# Patient Record
Sex: FEMALE | Race: WHITE | NOT HISPANIC OR LATINO | URBAN - METROPOLITAN AREA
[De-identification: names, ages, dates, MRNs, and addresses within clinical notes are randomized per-mention and may not be internally consistent; named-entity substitution may affect disease eponyms.]

---

## 2023-12-21 DIAGNOSIS — I10 HYPERTENSION, UNSPECIFIED TYPE: ICD-10-CM

## 2023-12-22 RX ORDER — METOPROLOL SUCCINATE 50 MG/1
50 TABLET, EXTENDED RELEASE ORAL NIGHTLY
Qty: 90 TABLET | Refills: 0 | OUTPATIENT
Start: 2023-12-22

## 2023-12-22 RX ORDER — AMLODIPINE BESYLATE 5 MG/1
5 TABLET ORAL NIGHTLY
Qty: 90 TABLET | Refills: 0 | OUTPATIENT
Start: 2023-12-22

## 2024-05-23 ENCOUNTER — APPOINTMENT (OUTPATIENT)
Dept: CARDIOLOGY | Facility: HOSPITAL | Age: 84
End: 2024-05-23
Payer: MEDICARE

## 2024-05-23 ENCOUNTER — HOSPITAL ENCOUNTER (EMERGENCY)
Facility: HOSPITAL | Age: 84
Discharge: HOME | End: 2024-05-23
Attending: EMERGENCY MEDICINE
Payer: MEDICARE

## 2024-05-23 VITALS
HEART RATE: 71 BPM | TEMPERATURE: 97.9 F | OXYGEN SATURATION: 97 % | DIASTOLIC BLOOD PRESSURE: 86 MMHG | WEIGHT: 97.88 LBS | HEIGHT: 63 IN | BODY MASS INDEX: 17.34 KG/M2 | RESPIRATION RATE: 16 BRPM | SYSTOLIC BLOOD PRESSURE: 172 MMHG

## 2024-05-23 DIAGNOSIS — I10 PRIMARY HYPERTENSION: Primary | ICD-10-CM

## 2024-05-23 DIAGNOSIS — R53.81 MALAISE: ICD-10-CM

## 2024-05-23 LAB
ANION GAP SERPL CALC-SCNC: 10 MMOL/L
ATRIAL RATE: 86 BPM
BASOPHILS # BLD AUTO: 0.03 X10*3/UL (ref 0–0.1)
BASOPHILS NFR BLD AUTO: 0.4 %
BUN SERPL-MCNC: 15 MG/DL (ref 8–25)
CALCIUM SERPL-MCNC: 9.7 MG/DL (ref 8.5–10.4)
CHLORIDE SERPL-SCNC: 105 MMOL/L (ref 97–107)
CO2 SERPL-SCNC: 26 MMOL/L (ref 24–31)
CREAT SERPL-MCNC: 1 MG/DL (ref 0.4–1.6)
EGFRCR SERPLBLD CKD-EPI 2021: 56 ML/MIN/1.73M*2
EOSINOPHIL # BLD AUTO: 0.01 X10*3/UL (ref 0–0.4)
EOSINOPHIL NFR BLD AUTO: 0.1 %
ERYTHROCYTE [DISTWIDTH] IN BLOOD BY AUTOMATED COUNT: 14 % (ref 11.5–14.5)
GLUCOSE SERPL-MCNC: 105 MG/DL (ref 65–99)
HCT VFR BLD AUTO: 39.3 % (ref 36–46)
HGB BLD-MCNC: 13 G/DL (ref 12–16)
IMM GRANULOCYTES # BLD AUTO: 0.01 X10*3/UL (ref 0–0.5)
IMM GRANULOCYTES NFR BLD AUTO: 0.1 % (ref 0–0.9)
LYMPHOCYTES # BLD AUTO: 1.27 X10*3/UL (ref 0.8–3)
LYMPHOCYTES NFR BLD AUTO: 18.2 %
MCH RBC QN AUTO: 29.1 PG (ref 26–34)
MCHC RBC AUTO-ENTMCNC: 33.1 G/DL (ref 32–36)
MCV RBC AUTO: 88 FL (ref 80–100)
MONOCYTES # BLD AUTO: 0.62 X10*3/UL (ref 0.05–0.8)
MONOCYTES NFR BLD AUTO: 8.9 %
NEUTROPHILS # BLD AUTO: 5.03 X10*3/UL (ref 1.6–5.5)
NEUTROPHILS NFR BLD AUTO: 72.3 %
NRBC BLD-RTO: 0 /100 WBCS (ref 0–0)
PLATELET # BLD AUTO: 253 X10*3/UL (ref 150–450)
POTASSIUM SERPL-SCNC: 4.4 MMOL/L (ref 3.4–5.1)
Q ONSET: 220 MS
QRS COUNT: 8 BEATS
QRS DURATION: 112 MS
QT INTERVAL: 540 MS
QTC CALCULATION(BAZETT): 477 MS
QTC FREDERICIA: 497 MS
R AXIS: 92 DEGREES
RBC # BLD AUTO: 4.46 X10*6/UL (ref 4–5.2)
SODIUM SERPL-SCNC: 141 MMOL/L (ref 133–145)
T AXIS: 78 DEGREES
T OFFSET: 490 MS
VENTRICULAR RATE: 47 BPM
WBC # BLD AUTO: 7 X10*3/UL (ref 4.4–11.3)

## 2024-05-23 PROCEDURE — 93005 ELECTROCARDIOGRAM TRACING: CPT

## 2024-05-23 PROCEDURE — 85025 COMPLETE CBC W/AUTO DIFF WBC: CPT | Performed by: EMERGENCY MEDICINE

## 2024-05-23 PROCEDURE — 80048 BASIC METABOLIC PNL TOTAL CA: CPT | Performed by: EMERGENCY MEDICINE

## 2024-05-23 PROCEDURE — 99283 EMERGENCY DEPT VISIT LOW MDM: CPT

## 2024-05-23 PROCEDURE — 36415 COLL VENOUS BLD VENIPUNCTURE: CPT | Performed by: EMERGENCY MEDICINE

## 2024-05-23 PROCEDURE — 2500000001 HC RX 250 WO HCPCS SELF ADMINISTERED DRUGS (ALT 637 FOR MEDICARE OP): Performed by: EMERGENCY MEDICINE

## 2024-05-23 RX ORDER — AMLODIPINE BESYLATE 5 MG/1
10 TABLET ORAL ONCE
Status: COMPLETED | OUTPATIENT
Start: 2024-05-23 | End: 2024-05-23

## 2024-05-23 RX ORDER — AMLODIPINE BESYLATE 10 MG/1
10 TABLET ORAL DAILY
Qty: 30 TABLET | Refills: 0 | Status: SHIPPED | OUTPATIENT
Start: 2024-05-23 | End: 2024-06-22

## 2024-05-23 RX ORDER — HYDROCHLOROTHIAZIDE 25 MG/1
25 TABLET ORAL DAILY
Qty: 30 TABLET | Refills: 0 | Status: SHIPPED | OUTPATIENT
Start: 2024-05-23 | End: 2024-06-22

## 2024-05-23 RX ADMIN — AMLODIPINE BESYLATE 10 MG: 5 TABLET ORAL at 14:41

## 2024-05-23 ASSESSMENT — PAIN SCALES - GENERAL
PAINLEVEL_OUTOF10: 0 - NO PAIN

## 2024-05-23 ASSESSMENT — COLUMBIA-SUICIDE SEVERITY RATING SCALE - C-SSRS
1. IN THE PAST MONTH, HAVE YOU WISHED YOU WERE DEAD OR WISHED YOU COULD GO TO SLEEP AND NOT WAKE UP?: NO
6. HAVE YOU EVER DONE ANYTHING, STARTED TO DO ANYTHING, OR PREPARED TO DO ANYTHING TO END YOUR LIFE?: NO
2. HAVE YOU ACTUALLY HAD ANY THOUGHTS OF KILLING YOURSELF?: NO

## 2024-05-23 ASSESSMENT — PAIN - FUNCTIONAL ASSESSMENT: PAIN_FUNCTIONAL_ASSESSMENT: 0-10

## 2024-05-23 NOTE — ED PROVIDER NOTES
HPI   Chief Complaint   Patient presents with    Dizziness     Patient reports running out of her BP medication a couple months ago and has not been taking it. Patient feels lightheaded today. /64       83-year-old female presents for chief complaint of feeling malaised not her normal self.  This has been ongoing for approximately past 1 week.  Feeling mildly lightheaded but no dizziness, room spinning or vertiginous symptoms.  No feeling of off-balance.  States that she has not been taking her amlodipine, metoprolol for the past approximately 3 months because her blood pressure was better she thought she was okay so did not follow-up to get them refilled.  No chest pain, shortness of breath or other specific symptoms.  No headache.  No focal weakness or numbness.                           No data recorded                   Patient History   No past medical history on file.  Past Surgical History:   Procedure Laterality Date    CT GUIDED IMAGING FOR NEEDLE PLACEMENT  7/17/2017    CT GUIDED IMAGING FOR NEEDLE PLACEMENT [facility]     No family history on file.  Social History     Tobacco Use    Smoking status: Not on file    Smokeless tobacco: Not on file   Substance Use Topics    Alcohol use: Not on file    Drug use: Not on file       Physical Exam   ED Triage Vitals [05/23/24 1117]   Temperature Heart Rate Respirations BP   36.7 °C (98.1 °F) 50 18 170/64      Pulse Ox Temp Source Heart Rate Source Patient Position   97 % Temporal Monitor Sitting      BP Location FiO2 (%)     Right arm --       Physical Exam  Vitals and nursing note reviewed.     Constitutional:  Awake, alert, well appearing, nontoxic  HEENT:  Normocephalic, atraumatic  Neck: Trachea midline, no stridor  Respiratory/Chest:  Clear to auscultation bilaterally, no wheezes, rhonchi, or rales  CV:  Regular rate and regular rhythm, no murmurs, gallops, or rubs  Neuro: AAOx3, cranial nerves intact, strength 5/5 x 4 extremities, sensation  diffusely intact, no ataxia on extremeties, no truncal ataxia, normal test of skew, steady gait, NIH 0  Skin:  Warm and dry  ED Course & MDM   ED Course as of 05/23/24 1429   Thu May 23, 2024   1145 EKG on my independent interpretation: Sinus rhythm 47 bpm, rightward axis, incomplete right bundle branch block morphology but normal intervals, no acute ST or T wave abnormalities [TSERING]      ED Course User Index  [TSERING] Risa House MD         Diagnoses as of 05/23/24 1429   Primary hypertension   Malaise       Medical Decision Making  83-year-old female presents for malaise, hypertension, and lightheadedness.  No vertiginous or room spinning, NIH 0 no cerebellar findings on exam, normal test of skew, steady gait low suspicion for CVA.  Consider may be related to her significantly elevated blood pressure.  Orthostatic negative.  EKG without arrhythmia.  No syncope/near syncope low suspicion for cardiac etiology.  No chest pain to suggest ACS.  Labs obtained without significant abnormality.  Patient given dose of Norvasc which was her prior blood pressure medication for which she has been noncompliant for the past 3 months.  Heart rate bradycardic at times although she is not particularly symptomatic of this bradycardia although she was previously on metoprolol we will defer adding this medication back at this time given low heart rate we will add hydrochlorothiazide as well prescribe both of these for home.  Refer to PCP for follow-up.  Return precautions discussed.    Amount and/or Complexity of Data Reviewed  Labs: ordered. Decision-making details documented in ED Course.  Radiology: ordered and independent interpretation performed. Decision-making details documented in ED Course.  ECG/medicine tests: ordered and independent interpretation performed. Decision-making details documented in ED Course.        Procedure  Procedures     Risa House MD  05/23/24 9855

## 2024-05-23 NOTE — DISCHARGE INSTRUCTIONS
Begin taking amlodipine and hydrochlorothiazide for high blood pressure and make an appointment to follow-up with primary care physician as soon as possible.

## 2025-04-15 ENCOUNTER — APPOINTMENT (OUTPATIENT)
Dept: CARDIOLOGY | Facility: HOSPITAL | Age: 85
End: 2025-04-15
Payer: COMMERCIAL

## 2025-04-15 ENCOUNTER — APPOINTMENT (OUTPATIENT)
Dept: RADIOLOGY | Facility: HOSPITAL | Age: 85
End: 2025-04-15
Payer: COMMERCIAL

## 2025-04-15 ENCOUNTER — HOSPITAL ENCOUNTER (INPATIENT)
Facility: HOSPITAL | Age: 85
LOS: 2 days | Discharge: HOME | End: 2025-04-17
Admitting: STUDENT IN AN ORGANIZED HEALTH CARE EDUCATION/TRAINING PROGRAM
Payer: COMMERCIAL

## 2025-04-15 DIAGNOSIS — I10 ESSENTIAL HYPERTENSION: ICD-10-CM

## 2025-04-15 DIAGNOSIS — R00.1 BRADYCARDIA: ICD-10-CM

## 2025-04-15 DIAGNOSIS — I15.1 HYPERTENSION SECONDARY TO OTHER RENAL DISORDERS: ICD-10-CM

## 2025-04-15 DIAGNOSIS — I44.1 ATRIOVENTRICULAR BLOCK, SECOND DEGREE: ICD-10-CM

## 2025-04-15 DIAGNOSIS — I16.0 HYPERTENSIVE URGENCY: Primary | ICD-10-CM

## 2025-04-15 PROBLEM — L21.0 SEBORRHEA CAPITIS: Status: ACTIVE | Noted: 2025-04-15

## 2025-04-15 PROBLEM — C83.33 DIFFUSE LARGE B-CELL LYMPHOMA OF INTRA-ABDOMINAL LYMPH NODES (MULTI): Status: ACTIVE | Noted: 2025-04-15

## 2025-04-15 PROBLEM — E46 PROTEIN CALORIE MALNUTRITION (MULTI): Status: ACTIVE | Noted: 2025-04-15

## 2025-04-15 PROBLEM — E63.8 NUTRITIONAL INTAKE LESS THAN BODY REQUIREMENTS: Status: ACTIVE | Noted: 2025-04-15

## 2025-04-15 PROBLEM — Z85.72 HISTORY OF LYMPHOMA: Status: ACTIVE | Noted: 2025-04-15

## 2025-04-15 PROBLEM — E55.9 VITAMIN D DEFICIENCY: Status: ACTIVE | Noted: 2025-04-15

## 2025-04-15 PROBLEM — M65.30 ACQUIRED TRIGGER FINGER: Status: ACTIVE | Noted: 2025-04-15

## 2025-04-15 PROBLEM — K63.5 POLYP OF COLON: Status: ACTIVE | Noted: 2025-04-15

## 2025-04-15 PROBLEM — M19.90 OSTEOARTHRITIS: Status: ACTIVE | Noted: 2025-04-15

## 2025-04-15 PROBLEM — E83.52 HYPERCALCEMIA: Status: ACTIVE | Noted: 2025-04-15

## 2025-04-15 PROBLEM — R73.01 IMPAIRED FASTING GLUCOSE: Status: ACTIVE | Noted: 2025-04-15

## 2025-04-15 PROBLEM — M81.0 OSTEOPOROSIS: Status: ACTIVE | Noted: 2025-04-15

## 2025-04-15 PROBLEM — R63.0 LOSS OF APPETITE: Status: ACTIVE | Noted: 2025-04-15

## 2025-04-15 LAB
ALBUMIN SERPL BCP-MCNC: 3.4 G/DL (ref 3.4–5)
ALP SERPL-CCNC: 99 U/L (ref 33–136)
ALT SERPL W P-5'-P-CCNC: 27 U/L (ref 7–45)
ANION GAP SERPL CALCULATED.3IONS-SCNC: 9 MMOL/L (ref 10–20)
AST SERPL W P-5'-P-CCNC: 27 U/L (ref 9–39)
BASOPHILS # BLD AUTO: 0.01 X10*3/UL (ref 0–0.1)
BASOPHILS NFR BLD AUTO: 0.2 %
BILIRUB SERPL-MCNC: 0.8 MG/DL (ref 0–1.2)
BUN SERPL-MCNC: 22 MG/DL (ref 6–23)
CALCIUM SERPL-MCNC: 8.3 MG/DL (ref 8.6–10.3)
CARDIAC TROPONIN I PNL SERPL HS: 14 NG/L (ref 0–13)
CARDIAC TROPONIN I PNL SERPL HS: 16 NG/L (ref 0–13)
CHLORIDE SERPL-SCNC: 105 MMOL/L (ref 98–107)
CO2 SERPL-SCNC: 26 MMOL/L (ref 21–32)
CREAT SERPL-MCNC: 0.65 MG/DL (ref 0.5–1.05)
EGFRCR SERPLBLD CKD-EPI 2021: 87 ML/MIN/1.73M*2
EOSINOPHIL # BLD AUTO: 0 X10*3/UL (ref 0–0.4)
EOSINOPHIL NFR BLD AUTO: 0 %
ERYTHROCYTE [DISTWIDTH] IN BLOOD BY AUTOMATED COUNT: 14.9 % (ref 11.5–14.5)
GLUCOSE SERPL-MCNC: 119 MG/DL (ref 74–99)
HCT VFR BLD AUTO: 34.5 % (ref 36–46)
HGB BLD-MCNC: 11.2 G/DL (ref 12–16)
IMM GRANULOCYTES # BLD AUTO: 0.02 X10*3/UL (ref 0–0.5)
IMM GRANULOCYTES NFR BLD AUTO: 0.3 % (ref 0–0.9)
LYMPHOCYTES # BLD AUTO: 0.93 X10*3/UL (ref 0.8–3)
LYMPHOCYTES NFR BLD AUTO: 15.4 %
MCH RBC QN AUTO: 29.4 PG (ref 26–34)
MCHC RBC AUTO-ENTMCNC: 32.5 G/DL (ref 32–36)
MCV RBC AUTO: 91 FL (ref 80–100)
MONOCYTES # BLD AUTO: 0.28 X10*3/UL (ref 0.05–0.8)
MONOCYTES NFR BLD AUTO: 4.7 %
NEUTROPHILS # BLD AUTO: 4.78 X10*3/UL (ref 1.6–5.5)
NEUTROPHILS NFR BLD AUTO: 79.4 %
NRBC BLD-RTO: 0 /100 WBCS (ref 0–0)
PLATELET # BLD AUTO: 225 X10*3/UL (ref 150–450)
POTASSIUM SERPL-SCNC: 3.4 MMOL/L (ref 3.5–5.3)
PROT SERPL-MCNC: 5.8 G/DL (ref 6.4–8.2)
RBC # BLD AUTO: 3.81 X10*6/UL (ref 4–5.2)
SODIUM SERPL-SCNC: 137 MMOL/L (ref 136–145)
WBC # BLD AUTO: 6 X10*3/UL (ref 4.4–11.3)

## 2025-04-15 PROCEDURE — 70450 CT HEAD/BRAIN W/O DYE: CPT

## 2025-04-15 PROCEDURE — 36415 COLL VENOUS BLD VENIPUNCTURE: CPT

## 2025-04-15 PROCEDURE — 84484 ASSAY OF TROPONIN QUANT: CPT

## 2025-04-15 PROCEDURE — 93005 ELECTROCARDIOGRAM TRACING: CPT

## 2025-04-15 PROCEDURE — 2500000004 HC RX 250 GENERAL PHARMACY W/ HCPCS (ALT 636 FOR OP/ED)

## 2025-04-15 PROCEDURE — 99291 CRITICAL CARE FIRST HOUR: CPT

## 2025-04-15 PROCEDURE — 80053 COMPREHEN METABOLIC PANEL: CPT

## 2025-04-15 PROCEDURE — 85025 COMPLETE CBC W/AUTO DIFF WBC: CPT

## 2025-04-15 PROCEDURE — 93010 ELECTROCARDIOGRAM REPORT: CPT | Performed by: INTERNAL MEDICINE

## 2025-04-15 PROCEDURE — 2500000001 HC RX 250 WO HCPCS SELF ADMINISTERED DRUGS (ALT 637 FOR MEDICARE OP)

## 2025-04-15 PROCEDURE — 70450 CT HEAD/BRAIN W/O DYE: CPT | Performed by: RADIOLOGY

## 2025-04-15 PROCEDURE — 96375 TX/PRO/DX INJ NEW DRUG ADDON: CPT

## 2025-04-15 PROCEDURE — 2020000001 HC ICU ROOM DAILY

## 2025-04-15 PROCEDURE — 96374 THER/PROPH/DIAG INJ IV PUSH: CPT

## 2025-04-15 RX ORDER — HYDROCHLOROTHIAZIDE 25 MG/1
25 TABLET ORAL ONCE
Status: COMPLETED | OUTPATIENT
Start: 2025-04-15 | End: 2025-04-15

## 2025-04-15 RX ORDER — NICARDIPINE HYDROCHLORIDE 0.2 MG/ML
2.5-15 INJECTION INTRAVENOUS CONTINUOUS
Status: DISCONTINUED | OUTPATIENT
Start: 2025-04-15 | End: 2025-04-16

## 2025-04-15 RX ORDER — ATROPINE SULFATE 0.1 MG/ML
0.4 INJECTION INTRAVENOUS ONCE
Status: COMPLETED | OUTPATIENT
Start: 2025-04-15 | End: 2025-04-15

## 2025-04-15 RX ORDER — ACETAMINOPHEN 325 MG/1
975 TABLET ORAL ONCE
Status: COMPLETED | OUTPATIENT
Start: 2025-04-15 | End: 2025-04-15

## 2025-04-15 RX ORDER — FAMOTIDINE 10 MG/ML
20 INJECTION, SOLUTION INTRAVENOUS DAILY
Status: DISCONTINUED | OUTPATIENT
Start: 2025-04-16 | End: 2025-04-17 | Stop reason: HOSPADM

## 2025-04-15 RX ORDER — PROCHLORPERAZINE EDISYLATE 5 MG/ML
10 INJECTION INTRAMUSCULAR; INTRAVENOUS ONCE
Status: COMPLETED | OUTPATIENT
Start: 2025-04-15 | End: 2025-04-15

## 2025-04-15 RX ORDER — DIPHENHYDRAMINE HYDROCHLORIDE 50 MG/ML
25 INJECTION, SOLUTION INTRAMUSCULAR; INTRAVENOUS ONCE
Status: COMPLETED | OUTPATIENT
Start: 2025-04-15 | End: 2025-04-15

## 2025-04-15 RX ORDER — ACETAMINOPHEN 160 MG/5ML
650 SOLUTION ORAL EVERY 4 HOURS PRN
Status: DISCONTINUED | OUTPATIENT
Start: 2025-04-15 | End: 2025-04-17 | Stop reason: HOSPADM

## 2025-04-15 RX ORDER — AMLODIPINE BESYLATE 10 MG/1
10 TABLET ORAL DAILY
Status: DISCONTINUED | OUTPATIENT
Start: 2025-04-16 | End: 2025-04-16

## 2025-04-15 RX ORDER — FAMOTIDINE 20 MG/1
20 TABLET, FILM COATED ORAL DAILY
Status: DISCONTINUED | OUTPATIENT
Start: 2025-04-16 | End: 2025-04-17 | Stop reason: HOSPADM

## 2025-04-15 RX ORDER — KETOROLAC TROMETHAMINE 30 MG/ML
15 INJECTION, SOLUTION INTRAMUSCULAR; INTRAVENOUS ONCE
Status: COMPLETED | OUTPATIENT
Start: 2025-04-15 | End: 2025-04-15

## 2025-04-15 RX ORDER — HYDRALAZINE HYDROCHLORIDE 20 MG/ML
5 INJECTION INTRAMUSCULAR; INTRAVENOUS ONCE
Status: COMPLETED | OUTPATIENT
Start: 2025-04-15 | End: 2025-04-15

## 2025-04-15 RX ORDER — ACETAMINOPHEN 325 MG/1
650 TABLET ORAL EVERY 4 HOURS PRN
Status: DISCONTINUED | OUTPATIENT
Start: 2025-04-15 | End: 2025-04-17 | Stop reason: HOSPADM

## 2025-04-15 RX ORDER — ATROPINE SULFATE 0.1 MG/ML
INJECTION INTRAVENOUS
Status: COMPLETED
Start: 2025-04-15 | End: 2025-04-15

## 2025-04-15 RX ORDER — AMLODIPINE BESYLATE 5 MG/1
10 TABLET ORAL ONCE
Status: COMPLETED | OUTPATIENT
Start: 2025-04-15 | End: 2025-04-15

## 2025-04-15 RX ORDER — ACETAMINOPHEN 650 MG/1
650 SUPPOSITORY RECTAL EVERY 4 HOURS PRN
Status: DISCONTINUED | OUTPATIENT
Start: 2025-04-15 | End: 2025-04-17 | Stop reason: HOSPADM

## 2025-04-15 RX ORDER — POLYETHYLENE GLYCOL 3350 17 G/17G
17 POWDER, FOR SOLUTION ORAL DAILY
Status: DISCONTINUED | OUTPATIENT
Start: 2025-04-16 | End: 2025-04-17 | Stop reason: HOSPADM

## 2025-04-15 RX ADMIN — DIPHENHYDRAMINE HYDROCHLORIDE 25 MG: 50 INJECTION, SOLUTION INTRAMUSCULAR; INTRAVENOUS at 16:15

## 2025-04-15 RX ADMIN — AMLODIPINE BESYLATE 10 MG: 5 TABLET ORAL at 19:56

## 2025-04-15 RX ADMIN — HYDRALAZINE HYDROCHLORIDE 5 MG: 20 INJECTION INTRAMUSCULAR; INTRAVENOUS at 21:33

## 2025-04-15 RX ADMIN — PROCHLORPERAZINE EDISYLATE 10 MG: 5 INJECTION INTRAMUSCULAR; INTRAVENOUS at 16:15

## 2025-04-15 RX ADMIN — NICARDIPINE HYDROCHLORIDE 2.5 MG/HR: 0.2 INJECTION INTRAVENOUS at 22:55

## 2025-04-15 RX ADMIN — ATROPINE SULFATE 0.4 MG: 0.1 INJECTION INTRAVENOUS at 19:57

## 2025-04-15 RX ADMIN — HYDROCHLOROTHIAZIDE 25 MG: 25 TABLET ORAL at 19:56

## 2025-04-15 RX ADMIN — ACETAMINOPHEN 975 MG: 325 TABLET ORAL at 16:14

## 2025-04-15 RX ADMIN — KETOROLAC TROMETHAMINE 15 MG: 30 INJECTION, SOLUTION INTRAMUSCULAR at 22:24

## 2025-04-15 SDOH — SOCIAL STABILITY: SOCIAL INSECURITY: HAVE YOU HAD ANY THOUGHTS OF HARMING ANYONE ELSE?: NO

## 2025-04-15 SDOH — SOCIAL STABILITY: SOCIAL INSECURITY: WITHIN THE LAST YEAR, HAVE YOU BEEN AFRAID OF YOUR PARTNER OR EX-PARTNER?: NO

## 2025-04-15 SDOH — ECONOMIC STABILITY: FOOD INSECURITY: WITHIN THE PAST 12 MONTHS, YOU WORRIED THAT YOUR FOOD WOULD RUN OUT BEFORE YOU GOT THE MONEY TO BUY MORE.: NEVER TRUE

## 2025-04-15 SDOH — ECONOMIC STABILITY: INCOME INSECURITY: IN THE PAST 12 MONTHS HAS THE ELECTRIC, GAS, OIL, OR WATER COMPANY THREATENED TO SHUT OFF SERVICES IN YOUR HOME?: NO

## 2025-04-15 SDOH — SOCIAL STABILITY: SOCIAL INSECURITY: WITHIN THE LAST YEAR, HAVE YOU BEEN HUMILIATED OR EMOTIONALLY ABUSED IN OTHER WAYS BY YOUR PARTNER OR EX-PARTNER?: NO

## 2025-04-15 SDOH — SOCIAL STABILITY: SOCIAL INSECURITY: WERE YOU ABLE TO COMPLETE ALL THE BEHAVIORAL HEALTH SCREENINGS?: YES

## 2025-04-15 SDOH — SOCIAL STABILITY: SOCIAL INSECURITY: DO YOU FEEL ANYONE HAS EXPLOITED OR TAKEN ADVANTAGE OF YOU FINANCIALLY OR OF YOUR PERSONAL PROPERTY?: NO

## 2025-04-15 SDOH — SOCIAL STABILITY: SOCIAL INSECURITY
WITHIN THE LAST YEAR, HAVE YOU BEEN RAPED OR FORCED TO HAVE ANY KIND OF SEXUAL ACTIVITY BY YOUR PARTNER OR EX-PARTNER?: NO

## 2025-04-15 SDOH — SOCIAL STABILITY: SOCIAL INSECURITY
WITHIN THE LAST YEAR, HAVE YOU BEEN KICKED, HIT, SLAPPED, OR OTHERWISE PHYSICALLY HURT BY YOUR PARTNER OR EX-PARTNER?: NO

## 2025-04-15 SDOH — SOCIAL STABILITY: SOCIAL INSECURITY: DO YOU FEEL UNSAFE GOING BACK TO THE PLACE WHERE YOU ARE LIVING?: NO

## 2025-04-15 SDOH — SOCIAL STABILITY: SOCIAL INSECURITY: ARE THERE ANY APPARENT SIGNS OF INJURIES/BEHAVIORS THAT COULD BE RELATED TO ABUSE/NEGLECT?: NO

## 2025-04-15 SDOH — ECONOMIC STABILITY: FOOD INSECURITY: WITHIN THE PAST 12 MONTHS, THE FOOD YOU BOUGHT JUST DIDN'T LAST AND YOU DIDN'T HAVE MONEY TO GET MORE.: NEVER TRUE

## 2025-04-15 SDOH — SOCIAL STABILITY: SOCIAL INSECURITY: ARE YOU OR HAVE YOU BEEN THREATENED OR ABUSED PHYSICALLY, EMOTIONALLY, OR SEXUALLY BY ANYONE?: NO

## 2025-04-15 SDOH — SOCIAL STABILITY: SOCIAL INSECURITY: HAVE YOU HAD THOUGHTS OF HARMING ANYONE ELSE?: NO

## 2025-04-15 SDOH — SOCIAL STABILITY: SOCIAL INSECURITY: HAS ANYONE EVER THREATENED TO HURT YOUR FAMILY OR YOUR PETS?: NO

## 2025-04-15 SDOH — SOCIAL STABILITY: SOCIAL INSECURITY: ABUSE: ADULT

## 2025-04-15 SDOH — SOCIAL STABILITY: SOCIAL INSECURITY: DOES ANYONE TRY TO KEEP YOU FROM HAVING/CONTACTING OTHER FRIENDS OR DOING THINGS OUTSIDE YOUR HOME?: NO

## 2025-04-15 ASSESSMENT — COGNITIVE AND FUNCTIONAL STATUS - GENERAL
DRESSING REGULAR LOWER BODY CLOTHING: TOTAL
MOVING TO AND FROM BED TO CHAIR: TOTAL
DRESSING REGULAR UPPER BODY CLOTHING: TOTAL
MOBILITY SCORE: 6
HELP NEEDED FOR BATHING: TOTAL
EATING MEALS: TOTAL
MOVING FROM LYING ON BACK TO SITTING ON SIDE OF FLAT BED WITH BEDRAILS: TOTAL
TOILETING: TOTAL
CLIMB 3 TO 5 STEPS WITH RAILING: TOTAL
STANDING UP FROM CHAIR USING ARMS: TOTAL
WALKING IN HOSPITAL ROOM: TOTAL
DAILY ACTIVITIY SCORE: 6
PATIENT BASELINE BEDBOUND: NO
PERSONAL GROOMING: TOTAL
TURNING FROM BACK TO SIDE WHILE IN FLAT BAD: TOTAL

## 2025-04-15 ASSESSMENT — ACTIVITIES OF DAILY LIVING (ADL)
ADEQUATE_TO_COMPLETE_ADL: YES
GROOMING: INDEPENDENT
JUDGMENT_ADEQUATE_SAFELY_COMPLETE_DAILY_ACTIVITIES: YES
WALKS IN HOME: INDEPENDENT
HEARING - RIGHT EAR: FUNCTIONAL
HEARING - LEFT EAR: FUNCTIONAL
FEEDING YOURSELF: INDEPENDENT
DRESSING YOURSELF: INDEPENDENT
BATHING: INDEPENDENT
TOILETING: INDEPENDENT
PATIENT'S MEMORY ADEQUATE TO SAFELY COMPLETE DAILY ACTIVITIES?: YES
LACK_OF_TRANSPORTATION: NO

## 2025-04-15 ASSESSMENT — LIFESTYLE VARIABLES
AUDIT-C TOTAL SCORE: 0
HOW OFTEN DO YOU HAVE A DRINK CONTAINING ALCOHOL: NEVER
HOW MANY STANDARD DRINKS CONTAINING ALCOHOL DO YOU HAVE ON A TYPICAL DAY: PATIENT DOES NOT DRINK
HOW OFTEN DO YOU HAVE 6 OR MORE DRINKS ON ONE OCCASION: NEVER
AUDIT-C TOTAL SCORE: 0
SKIP TO QUESTIONS 9-10: 1

## 2025-04-15 ASSESSMENT — PAIN DESCRIPTION - LOCATION: LOCATION: HEAD

## 2025-04-15 ASSESSMENT — PAIN SCALES - GENERAL
PAINLEVEL_OUTOF10: 8
PAINLEVEL_OUTOF10: 0 - NO PAIN

## 2025-04-15 ASSESSMENT — PAIN - FUNCTIONAL ASSESSMENT: PAIN_FUNCTIONAL_ASSESSMENT: 0-10

## 2025-04-15 ASSESSMENT — PATIENT HEALTH QUESTIONNAIRE - PHQ9
2. FEELING DOWN, DEPRESSED OR HOPELESS: NOT AT ALL
1. LITTLE INTEREST OR PLEASURE IN DOING THINGS: NOT AT ALL
SUM OF ALL RESPONSES TO PHQ9 QUESTIONS 1 & 2: 0

## 2025-04-15 ASSESSMENT — COLUMBIA-SUICIDE SEVERITY RATING SCALE - C-SSRS
2. HAVE YOU ACTUALLY HAD ANY THOUGHTS OF KILLING YOURSELF?: NO
6. HAVE YOU EVER DONE ANYTHING, STARTED TO DO ANYTHING, OR PREPARED TO DO ANYTHING TO END YOUR LIFE?: NO
1. IN THE PAST MONTH, HAVE YOU WISHED YOU WERE DEAD OR WISHED YOU COULD GO TO SLEEP AND NOT WAKE UP?: NO

## 2025-04-15 NOTE — ED PROVIDER NOTES
THIS IS MY EMANUEL SUPERVISORY AND SHARED VISIT NOTE:    I personally saw the patient and made/approved the management plan and take responsibility for the patient management.    History: Patient is a 84-year-old female presents today with a chief complaint of a headache, she states she has had a headache for the last few weeks, she also having some generalized pain, has a history of high blood pressure, is not taking medications for this, the patient took Aleve, does not have much relief at all, patient denies any chest pain shortness of breath denies any dizziness or blurred vision denies any fevers or chills.    Exam: GENERAL APPEARANCE: Awake and alert.     HEENT: Normocephalic, atraumatic. Extraocular muscles are intact. Pupils equal round and reactive to light.  CHEST: Nontender to palpation. Clear to auscultation bilaterally. No rales, rhonchi, or wheezing.   HEART: S1, S2. Regular rate and rhythm. No murmurs, gallops or rubs.  Strong and equal pulses in the extremities.   ABDOMEN: Soft,.  non-tender.  No rebound or guarding, bowel sounds normal x 4 quadrants  NEUROLOGICAL: Awake, alert and oriented x 3.  Cranial nerves II through XII grossly intact      MDM: Patient seen and evaluated at bedside, patient is in no acute distress.  I will order a CBC, CMP, troponin, CT head, EKG, give the patient Tylenol, Compazine, Benadryl, hydrochlorothiazide, Norvasc, EKG. Differential diagnosis includes but is not limited to hypertensive urgency, hypertensive emergency, ACS, headache, migraine  Patient CBC shows hemoglobin 11.2, hematocrit 34.5, calcium 3.4, troponin 14 or 16, patient CT head results listed below, EKG does show a second-degree type II block, patient will be admitted to the general medicine team with a cardiology consultation.  No  Diagnosis: Hypertensive urgency, bradycardia  Transthoracic Echo (TTE) Complete   Final Result      CT head wo IV contrast   Final Result   No acute intracranial abnormality.         Chronic changes as noted above.        MACRO:   None        Signed by: Aric Brewer 4/15/2025 6:13 PM   Dictation workstation:   [id number]      Vascular US Renal Artery Duplex Complete    (Results Pending)     Results for orders placed or performed during the hospital encounter of 04/15/25   CBC and Auto Differential    Collection Time: 04/15/25  7:08 PM   Result Value Ref Range    WBC 6.0 4.4 - 11.3 x10*3/uL    nRBC 0.0 0.0 - 0.0 /100 WBCs    RBC 3.81 (L) 4.00 - 5.20 x10*6/uL    Hemoglobin 11.2 (L) 12.0 - 16.0 g/dL    Hematocrit 34.5 (L) 36.0 - 46.0 %    MCV 91 80 - 100 fL    MCH 29.4 26.0 - 34.0 pg    MCHC 32.5 32.0 - 36.0 g/dL    RDW 14.9 (H) 11.5 - 14.5 %    Platelets 225 150 - 450 x10*3/uL    Neutrophils % 79.4 40.0 - 80.0 %    Immature Granulocytes %, Automated 0.3 0.0 - 0.9 %    Lymphocytes % 15.4 13.0 - 44.0 %    Monocytes % 4.7 2.0 - 10.0 %    Eosinophils % 0.0 0.0 - 6.0 %    Basophils % 0.2 0.0 - 2.0 %    Neutrophils Absolute 4.78 1.60 - 5.50 x10*3/uL    Immature Granulocytes Absolute, Automated 0.02 0.00 - 0.50 x10*3/uL    Lymphocytes Absolute 0.93 0.80 - 3.00 x10*3/uL    Monocytes Absolute 0.28 0.05 - 0.80 x10*3/uL    Eosinophils Absolute 0.00 0.00 - 0.40 x10*3/uL    Basophils Absolute 0.01 0.00 - 0.10 x10*3/uL   Comprehensive metabolic panel    Collection Time: 04/15/25  7:08 PM   Result Value Ref Range    Glucose 119 (H) 74 - 99 mg/dL    Sodium 137 136 - 145 mmol/L    Potassium 3.4 (L) 3.5 - 5.3 mmol/L    Chloride 105 98 - 107 mmol/L    Bicarbonate 26 21 - 32 mmol/L    Anion Gap 9 (L) 10 - 20 mmol/L    Urea Nitrogen 22 6 - 23 mg/dL    Creatinine 0.65 0.50 - 1.05 mg/dL    eGFR 87 >60 mL/min/1.73m*2    Calcium 8.3 (L) 8.6 - 10.3 mg/dL    Albumin 3.4 3.4 - 5.0 g/dL    Alkaline Phosphatase 99 33 - 136 U/L    Total Protein 5.8 (L) 6.4 - 8.2 g/dL    AST 27 9 - 39 U/L    Bilirubin, Total 0.8 0.0 - 1.2 mg/dL    ALT 27 7 - 45 U/L   Troponin I, High Sensitivity, Initial    Collection Time: 04/15/25  7:08  PM   Result Value Ref Range    Troponin I, High Sensitivity 14 (H) 0 - 13 ng/L   Electrocardiogram, 12-lead PRN ACS symptoms    Collection Time: 04/15/25  7:15 PM   Result Value Ref Range    Ventricular Rate 42 BPM    Atrial Rate 82 BPM    VA Interval 146 ms    QRS Duration 104 ms    QT Interval 686 ms    QTC Calculation(Bazett) 572 ms    P Axis 79 degrees    R Axis 95 degrees    T Axis 37 degrees    QRS Count 7 beats    Q Onset 223 ms    P Onset 150 ms    P Offset 201 ms    T Offset 566 ms    QTC Fredericia 609 ms   ECG 12 lead    Collection Time: 04/15/25  7:15 PM   Result Value Ref Range    Ventricular Rate 55 BPM    Atrial Rate 55 BPM    VA Interval 144 ms    QRS Duration 110 ms    QT Interval 602 ms    QTC Calculation(Bazett) 575 ms    P Axis 71 degrees    R Axis 90 degrees    T Axis 38 degrees    QRS Count 9 beats    Q Onset 219 ms    P Onset 147 ms    P Offset 197 ms    T Offset 520 ms    QTC Fredericia 584 ms   Troponin, High Sensitivity, 1 Hour    Collection Time: 04/15/25  8:02 PM   Result Value Ref Range    Troponin I, High Sensitivity 16 (H) 0 - 13 ng/L   CBC    Collection Time: 04/16/25  4:17 AM   Result Value Ref Range    WBC 7.2 4.4 - 11.3 x10*3/uL    nRBC 0.0 0.0 - 0.0 /100 WBCs    RBC 4.63 4.00 - 5.20 x10*6/uL    Hemoglobin 13.6 12.0 - 16.0 g/dL    Hematocrit 40.3 36.0 - 46.0 %    MCV 87 80 - 100 fL    MCH 29.4 26.0 - 34.0 pg    MCHC 33.7 32.0 - 36.0 g/dL    RDW 14.6 (H) 11.5 - 14.5 %    Platelets 288 150 - 450 x10*3/uL   Basic metabolic panel    Collection Time: 04/16/25  4:17 AM   Result Value Ref Range    Glucose 108 (H) 74 - 99 mg/dL    Sodium 133 (L) 136 - 145 mmol/L    Potassium 3.5 3.5 - 5.3 mmol/L    Chloride 100 98 - 107 mmol/L    Bicarbonate 26 21 - 32 mmol/L    Anion Gap 11 10 - 20 mmol/L    Urea Nitrogen 22 6 - 23 mg/dL    Creatinine 0.70 0.50 - 1.05 mg/dL    eGFR 85 >60 mL/min/1.73m*2    Calcium 9.2 8.6 - 10.3 mg/dL   Transthoracic Echo (TTE) Complete    Collection Time: 04/16/25 11:23  AM   Result Value Ref Range    AV pk pooja 1.79 m/s    LVOT diam 1.70 cm    MV E/A ratio 0.65     Tricuspid annular plane systolic excursion 2.3 cm    LV Biplane EF 65 %    LV EF 68 %    RV free wall pk S' 11.30 cm/s    RVSP 27.8 mmHg    LVIDd 3.65 cm    AV pk grad 13 mmHg    Aortic Valve Area by Continuity of Peak Velocity 2.43 cm2    LV A4C EF 64.1      Critical Care Time: 35 minutes excluding time spent performing procedures, treating other patients, and teaching time.    Critical Concern/Vital Organ System Affected: Cardiovascular system    Critical Intervention: Initiation of antihypertensive, discussion with consultants, close monitoring and reassessment    Please see EMANUEL note for further details    Sections of this report were created using voice-to-text technology and may contain errors in translation    Selvin Concepcion DO  Emergency Medicine       Selvin Concepcion DO  04/16/25 6343

## 2025-04-15 NOTE — ED PROVIDER NOTES
HPI   Chief Complaint   Patient presents with    Headache     Patient ambulatory tpo triage with co headache the past few weeks. Patient stated it is generalized and pain 8/10. Patient haqs hx of high blood pressure butdoesnt take any meds for it now NIH 0. Patient took aleve with little relief.        HPI  84-year-old female with history of hypertension presents for evaluation of headache.  Patient states she has been having intermittent headaches over the past 2 weeks but yesterday her headache was worse than normal and has progressed into today.  States that it is bilateral throbbing sensation in the front of her forehead and also in the back of her head.  Denies head injury or trauma.  No neck pain or stiffness.  No recent illness or fever.  States that she stopped taking her blood pressure medication a few months ago because she felt great and did not feel that she needed it anymore.  She denies extremity weakness or numbness.  No dizziness or lightheadedness.  Denies chest pain or shortness of breath.  Denies vision changes.  Otherwise has no acute complaints.      Patient History   No past medical history on file.  Past Surgical History:   Procedure Laterality Date    CT GUIDED IMAGING FOR NEEDLE PLACEMENT  7/17/2017    CT GUIDED IMAGING FOR NEEDLE PLACEMENT [facility]     No family history on file.  Social History     Tobacco Use    Smoking status: Not on file    Smokeless tobacco: Not on file   Substance Use Topics    Alcohol use: Not on file    Drug use: Not on file       Physical Exam   ED Triage Vitals [04/15/25 1532]   Temperature Heart Rate Respirations BP   36.5 °C (97.7 °F) (!) 49 16 (!) 214/78      Pulse Ox Temp Source Heart Rate Source Patient Position   98 % Temporal Monitor --      BP Location FiO2 (%)     -- --       Physical Exam  Vitals and nursing note reviewed.   Constitutional:       General: She is not in acute distress.     Appearance: She is well-developed.   HENT:      Head:  Normocephalic and atraumatic.   Eyes:      Conjunctiva/sclera: Conjunctivae normal.   Cardiovascular:      Rate and Rhythm: Normal rate and regular rhythm.      Heart sounds: No murmur heard.  Pulmonary:      Effort: Pulmonary effort is normal. No respiratory distress.      Breath sounds: Normal breath sounds.   Abdominal:      Palpations: Abdomen is soft.      Tenderness: There is no abdominal tenderness.   Musculoskeletal:         General: No swelling.      Cervical back: Neck supple.   Skin:     General: Skin is warm and dry.      Capillary Refill: Capillary refill takes less than 2 seconds.   Neurological:      Mental Status: She is alert and oriented to person, place, and time.      Comments: Oriented to person place and time, symmetric strength of bilateral upper and lower extremities, intact sensation bilaterally.  No facial droop or vision deficit.  No limb ataxia.  NIH stroke scale score of 0.   Psychiatric:         Mood and Affect: Mood normal.           ED Course & MDM   ED Course as of 04/15/25 2232   Tu Apr 15, 2025   1915 EKG interpreted by myself independently, EKG shows a second-degree AV block with 2 1 AVB conduction, rate of 42 bpm, parable 146, , QT C572, this is no solution present, negative for acute MI [RICHARD]   2053 Discussed EKG findings with cardiologist Dr. Sloan he states this is a 2-1 AV block that can be followed up with outpatient provided patient does not have any active symptoms at this time. [JJ]      ED Course User Index  [RICHARD] Selvin Concepcion DO  [JJ] Analilia Elise PA-C         Diagnoses as of 04/15/25 2232   Hypertensive urgency   Bradycardia                 No data recorded     [place] Coma Scale Score: 15 (04/15/25 1539 : Shira Vaca, ELAINE)                           Medical Decision Making  Parts of this chart have been completed using voice recognition software. Please excuse any errors of transcription.  My thought process and reason for plan has been formulated from the  time that I saw the patient until the time of disposition and is not specific to one specific moment during their visit and furthermore my MDM encompasses this entire chart and not only this text box.      HPI: Detailed above.    Exam: A medically appropriate exam performed, outlined above, given the known history and presentation.    History obtained from: Patient    EKG: Interpreted by attending physician and reviewed by me    Social Determinants of Health considered during this visit: Lives independently    Medications given during visit:  Medications   niCARdipine (Cardene) 40 mg in sodium chloride 200 mL (0.2 mg/mL) infusion (premix) (has no administration in time range)   diphenhydrAMINE (BENADryl) injection 25 mg (25 mg intravenous Given 4/15/25 1615)   prochlorperazine (Compazine) injection 10 mg (10 mg intravenous Given 4/15/25 1615)   acetaminophen (Tylenol) tablet 975 mg (975 mg oral Given 4/15/25 1614)   amLODIPine (Norvasc) tablet 10 mg (10 mg oral Given 4/15/25 1956)   hydroCHLOROthiazide (HYDRODiuril) tablet 25 mg (25 mg oral Given 4/15/25 1956)   atropine syringe 0.4 mg (0.4 mg intravenous Given 4/15/25 1957)   hydrALAZINE (Apresoline) injection 5 mg (5 mg intravenous Given 4/15/25 2133)   ketorolac (Toradol) injection 15 mg (15 mg intravenous Given 4/15/25 2224)        Diagnostic/tests  Labs Reviewed   CBC WITH AUTO DIFFERENTIAL - Abnormal       Result Value    WBC 6.0      nRBC 0.0      RBC 3.81 (*)     Hemoglobin 11.2 (*)     Hematocrit 34.5 (*)     MCV 91      MCH 29.4      MCHC 32.5      RDW 14.9 (*)     Platelets 225      Neutrophils % 79.4      Immature Granulocytes %, Automated 0.3      Lymphocytes % 15.4      Monocytes % 4.7      Eosinophils % 0.0      Basophils % 0.2      Neutrophils Absolute 4.78      Immature Granulocytes Absolute, Automated 0.02      Lymphocytes Absolute 0.93      Monocytes Absolute 0.28      Eosinophils Absolute 0.00      Basophils Absolute 0.01     COMPREHENSIVE  METABOLIC PANEL - Abnormal    Glucose 119 (*)     Sodium 137      Potassium 3.4 (*)     Chloride 105      Bicarbonate 26      Anion Gap 9 (*)     Urea Nitrogen 22      Creatinine 0.65      eGFR 87      Calcium 8.3 (*)     Albumin 3.4      Alkaline Phosphatase 99      Total Protein 5.8 (*)     AST 27      Bilirubin, Total 0.8      ALT 27     SERIAL TROPONIN-INITIAL - Abnormal    Troponin I, High Sensitivity 14 (*)     Narrative:     Less than 99th percentile of normal range cutoff-  Female and children under 18 years old <14 ng/L; Male <21 ng/L: Negative  Repeat testing should be performed if clinically indicated.     Female and children under 18 years old 14-50 ng/L; Male 21-50 ng/L:  Consistent with possible cardiac damage and possible increased clinical   risk. Serial measurements may help to assess extent of myocardial damage.     >50 ng/L: Consistent with cardiac damage, increased clinical risk and  myocardial infarction. Serial measurements may help assess extent of   myocardial damage.      NOTE: Children less than 1 year old may have higher baseline troponin   levels and results should be interpreted in conjunction with the overall   clinical context.     NOTE: Troponin I testing is performed using a different   testing methodology at [facility] than at other   [facility]. Direct result comparisons should only   be made within the same method.   SERIAL TROPONIN, 1 HOUR - Abnormal    Troponin I, High Sensitivity 16 (*)     Narrative:     Less than 99th percentile of normal range cutoff-  Female and children under 18 years old <14 ng/L; Male <21 ng/L: Negative  Repeat testing should be performed if clinically indicated.     Female and children under 18 years old 14-50 ng/L; Male 21-50 ng/L:  Consistent with possible cardiac damage and possible increased clinical   risk. Serial measurements may help to assess extent of myocardial damage.     >50 ng/L: Consistent with cardiac damage, increased  clinical risk and  myocardial infarction. Serial measurements may help assess extent of   myocardial damage.      NOTE: Children less than 1 year old may have higher baseline troponin   levels and results should be interpreted in conjunction with the overall   clinical context.     NOTE: Troponin I testing is performed using a different   testing methodology at [facility] than at other   [facility] hospitals. Direct result comparisons should only   be made within the same method.   TROPONIN SERIES- (INITIAL, 1 HR)    Narrative:     The following orders were created for panel order Troponin I Series, High Sensitivity (0, 1 HR).  Procedure                               Abnormality         Status                     ---------                               -----------         ------                     Troponin I, High Sensiti...[[id number]]  Abnormal            Final result               Troponin, High Sensitivi...[[id number]]  Abnormal            Final result                 Please view results for these tests on the individual orders.      CT head wo IV contrast   Final Result   No acute intracranial abnormality.        Chronic changes as noted above.        MACRO:   None        Signed by: Aric Brewer 4/15/2025 6:13 PM   Dictation workstation:   [id number]           Considerations/further MDM:  Patient is a 84-year-old female presenting for evaluation of headache    I saw this patient in conjunction with Dr. Concepcion    Differential diagnosis associated with the patient presentation includes: Hypertensive urgency versus hypertensive emergency versus tension headache versus other    Patient awake alert ambulatory without focal neurologic deficit on exam.  Vital signs significant for hypertension over 200 systolic otherwise unremarkable.  Heart and lung sounds are unremarkable.  Provided migraine cocktail and CT of the head was performed upon arrival without acute intracranial hemorrhage.  Doubt subarachnoid  hemorrhage provided chronicity of headache without maximal onset intensity.  Blood pressure continued to be elevated.  Patient's headache felt to be in the setting of hypertensive urgency.  Blood work was obtained and EKG was obtained without evidence of endorgan damage.  Patient provided her home amlodipine hydrochlorothiazide without significant improvement in her blood pressure.  Provided 5 mg injection of hydralazine. EKG was also found to have 2-1 AV block which was discussed with cardiology as discussed in ED course.  Provided the patient was bradycardic she was provided atropine during the visit.  Cardiology does not recommend further treatment as the patient is asymptomatic without chest pain, shortness of breath, lightheadedness or dizziness cardiology feels bradycardia may be worked up outpatient and does not require further inpatient management.  Patient was started on nicardipine titratable drip and admitted to ICU for further medical management.       Procedure  Critical Care    Performed by: Analilia Elise PA-C  Authorized by: Selvin Concepcion DO    Critical care provider statement:     Critical care time (minutes):  35    Critical care time was exclusive of:  Separately billable procedures and treating other patients    Critical care was necessary to treat or prevent imminent or life-threatening deterioration of the following conditions: Hypertensive urgency, IV medications.    Critical care was time spent personally by me on the following activities:  Discussions with consultants, development of treatment plan with patient or surrogate, evaluation of patient's response to treatment, examination of patient, obtaining history from patient or surrogate, ordering and performing treatments and interventions, ordering and review of laboratory studies, ordering and review of radiographic studies and re-evaluation of patient's condition    Care discussed with: admitting provider         Analilia Elise  [place]  04/15/25 [id number]

## 2025-04-16 ENCOUNTER — APPOINTMENT (OUTPATIENT)
Dept: CARDIOLOGY | Facility: HOSPITAL | Age: 85
End: 2025-04-16
Payer: COMMERCIAL

## 2025-04-16 PROBLEM — R79.89 ELEVATED TROPONIN: Status: ACTIVE | Noted: 2025-04-16

## 2025-04-16 LAB
ANION GAP SERPL CALCULATED.3IONS-SCNC: 11 MMOL/L (ref 10–20)
AORTIC VALVE PEAK VELOCITY: 1.79 M/S
ATRIAL RATE: 55 BPM
ATRIAL RATE: 82 BPM
AV PEAK GRADIENT: 13 MMHG
AVA (PEAK VEL): 2.43 CM2
BUN SERPL-MCNC: 22 MG/DL (ref 6–23)
CALCIUM SERPL-MCNC: 9.2 MG/DL (ref 8.6–10.3)
CHLORIDE SERPL-SCNC: 100 MMOL/L (ref 98–107)
CO2 SERPL-SCNC: 26 MMOL/L (ref 21–32)
CREAT SERPL-MCNC: 0.7 MG/DL (ref 0.5–1.05)
EGFRCR SERPLBLD CKD-EPI 2021: 85 ML/MIN/1.73M*2
EJECTION FRACTION APICAL 4 CHAMBER: 64.1
EJECTION FRACTION: 68 %
ERYTHROCYTE [DISTWIDTH] IN BLOOD BY AUTOMATED COUNT: 14.6 % (ref 11.5–14.5)
GLUCOSE SERPL-MCNC: 108 MG/DL (ref 74–99)
HCT VFR BLD AUTO: 40.3 % (ref 36–46)
HGB BLD-MCNC: 13.6 G/DL (ref 12–16)
LEFT VENTRICLE INTERNAL DIMENSION DIASTOLE: 3.65 CM (ref 3.5–6)
LEFT VENTRICULAR OUTFLOW TRACT DIAMETER: 1.7 CM
LV EJECTION FRACTION BIPLANE: 65 %
MCH RBC QN AUTO: 29.4 PG (ref 26–34)
MCHC RBC AUTO-ENTMCNC: 33.7 G/DL (ref 32–36)
MCV RBC AUTO: 87 FL (ref 80–100)
MITRAL VALVE E/A RATIO: 0.65
NRBC BLD-RTO: 0 /100 WBCS (ref 0–0)
P AXIS: 71 DEGREES
P AXIS: 79 DEGREES
P OFFSET: 197 MS
P OFFSET: 201 MS
P ONSET: 147 MS
P ONSET: 150 MS
PLATELET # BLD AUTO: 288 X10*3/UL (ref 150–450)
POTASSIUM SERPL-SCNC: 3.5 MMOL/L (ref 3.5–5.3)
PR INTERVAL: 144 MS
PR INTERVAL: 146 MS
Q ONSET: 219 MS
Q ONSET: 223 MS
QRS COUNT: 7 BEATS
QRS COUNT: 9 BEATS
QRS DURATION: 104 MS
QRS DURATION: 110 MS
QT INTERVAL: 602 MS
QT INTERVAL: 686 MS
QTC CALCULATION(BAZETT): 572 MS
QTC CALCULATION(BAZETT): 575 MS
QTC FREDERICIA: 584 MS
QTC FREDERICIA: 609 MS
R AXIS: 90 DEGREES
R AXIS: 95 DEGREES
RBC # BLD AUTO: 4.63 X10*6/UL (ref 4–5.2)
RIGHT VENTRICLE FREE WALL PEAK S': 11.3 CM/S
RIGHT VENTRICLE PEAK SYSTOLIC PRESSURE: 27.8 MMHG
SODIUM SERPL-SCNC: 133 MMOL/L (ref 136–145)
T AXIS: 37 DEGREES
T AXIS: 38 DEGREES
T OFFSET: 520 MS
T OFFSET: 566 MS
TRICUSPID ANNULAR PLANE SYSTOLIC EXCURSION: 2.3 CM
VENTRICULAR RATE: 42 BPM
VENTRICULAR RATE: 55 BPM
WBC # BLD AUTO: 7.2 X10*3/UL (ref 4.4–11.3)

## 2025-04-16 PROCEDURE — 36415 COLL VENOUS BLD VENIPUNCTURE: CPT | Performed by: STUDENT IN AN ORGANIZED HEALTH CARE EDUCATION/TRAINING PROGRAM

## 2025-04-16 PROCEDURE — 2500000001 HC RX 250 WO HCPCS SELF ADMINISTERED DRUGS (ALT 637 FOR MEDICARE OP): Performed by: STUDENT IN AN ORGANIZED HEALTH CARE EDUCATION/TRAINING PROGRAM

## 2025-04-16 PROCEDURE — 93306 TTE W/DOPPLER COMPLETE: CPT

## 2025-04-16 PROCEDURE — 80048 BASIC METABOLIC PNL TOTAL CA: CPT | Performed by: STUDENT IN AN ORGANIZED HEALTH CARE EDUCATION/TRAINING PROGRAM

## 2025-04-16 PROCEDURE — 99222 1ST HOSP IP/OBS MODERATE 55: CPT | Performed by: INTERNAL MEDICINE

## 2025-04-16 PROCEDURE — 36415 COLL VENOUS BLD VENIPUNCTURE: CPT | Performed by: INTERNAL MEDICINE

## 2025-04-16 PROCEDURE — 2500000001 HC RX 250 WO HCPCS SELF ADMINISTERED DRUGS (ALT 637 FOR MEDICARE OP): Performed by: INTERNAL MEDICINE

## 2025-04-16 PROCEDURE — 99232 SBSQ HOSP IP/OBS MODERATE 35: CPT | Performed by: INTERNAL MEDICINE

## 2025-04-16 PROCEDURE — 99223 1ST HOSP IP/OBS HIGH 75: CPT | Performed by: STUDENT IN AN ORGANIZED HEALTH CARE EDUCATION/TRAINING PROGRAM

## 2025-04-16 PROCEDURE — 84244 ASSAY OF RENIN: CPT | Performed by: INTERNAL MEDICINE

## 2025-04-16 PROCEDURE — 83835 ASSAY OF METANEPHRINES: CPT | Performed by: INTERNAL MEDICINE

## 2025-04-16 PROCEDURE — 2020000001 HC ICU ROOM DAILY

## 2025-04-16 PROCEDURE — 97161 PT EVAL LOW COMPLEX 20 MIN: CPT | Mod: GP

## 2025-04-16 PROCEDURE — 82088 ASSAY OF ALDOSTERONE: CPT | Performed by: INTERNAL MEDICINE

## 2025-04-16 PROCEDURE — 2500000004 HC RX 250 GENERAL PHARMACY W/ HCPCS (ALT 636 FOR OP/ED): Performed by: STUDENT IN AN ORGANIZED HEALTH CARE EDUCATION/TRAINING PROGRAM

## 2025-04-16 PROCEDURE — 97165 OT EVAL LOW COMPLEX 30 MIN: CPT | Mod: GO

## 2025-04-16 PROCEDURE — 85027 COMPLETE CBC AUTOMATED: CPT | Performed by: STUDENT IN AN ORGANIZED HEALTH CARE EDUCATION/TRAINING PROGRAM

## 2025-04-16 PROCEDURE — 97535 SELF CARE MNGMENT TRAINING: CPT | Mod: GO

## 2025-04-16 PROCEDURE — 93306 TTE W/DOPPLER COMPLETE: CPT | Performed by: INTERNAL MEDICINE

## 2025-04-16 RX ORDER — AMLODIPINE BESYLATE 5 MG/1
5 TABLET ORAL DAILY
Status: DISCONTINUED | OUTPATIENT
Start: 2025-04-16 | End: 2025-04-17 | Stop reason: HOSPADM

## 2025-04-16 RX ORDER — LOSARTAN POTASSIUM 25 MG/1
25 TABLET ORAL DAILY
Status: DISCONTINUED | OUTPATIENT
Start: 2025-04-16 | End: 2025-04-17 | Stop reason: HOSPADM

## 2025-04-16 RX ADMIN — FAMOTIDINE 20 MG: 20 TABLET, FILM COATED ORAL at 10:15

## 2025-04-16 RX ADMIN — LOSARTAN POTASSIUM 25 MG: 25 TABLET, FILM COATED ORAL at 10:16

## 2025-04-16 RX ADMIN — POLYETHYLENE GLYCOL 3350 17 G: 17 POWDER, FOR SOLUTION ORAL at 10:16

## 2025-04-16 RX ADMIN — AMLODIPINE BESYLATE 5 MG: 5 TABLET ORAL at 10:16

## 2025-04-16 ASSESSMENT — ENCOUNTER SYMPTOMS
FATIGUE: 1
NECK PAIN: 0
TREMORS: 0
RHINORRHEA: 0
CONSTIPATION: 0
WEAKNESS: 0
WOUND: 0
CHILLS: 0
POLYDIPSIA: 0
JOINT SWELLING: 0
TROUBLE SWALLOWING: 0
HEADACHES: 1
SORE THROAT: 0
ARTHRALGIAS: 0
COLOR CHANGE: 0
RECTAL PAIN: 0
FEVER: 0
DIARRHEA: 0
HALLUCINATIONS: 0
ABDOMINAL PAIN: 0
LIGHT-HEADEDNESS: 0
VOMITING: 0
NERVOUS/ANXIOUS: 0
DIZZINESS: 0
BLOOD IN STOOL: 0
ADENOPATHY: 0
BRUISES/BLEEDS EASILY: 0
HEMATURIA: 0
DYSPHORIC MOOD: 0
APNEA: 0
UNEXPECTED WEIGHT CHANGE: 0
BACK PAIN: 0
SINUS PRESSURE: 0
PALPITATIONS: 0
SHORTNESS OF BREATH: 0
CONFUSION: 0
NUMBNESS: 0
WHEEZING: 0
COUGH: 0
SPEECH DIFFICULTY: 0
FREQUENCY: 0
NAUSEA: 0
MYALGIAS: 0
PHOTOPHOBIA: 0
WEAKNESS: 1
DYSURIA: 0
SEIZURES: 0
POLYPHAGIA: 0
SLEEP DISTURBANCE: 0

## 2025-04-16 ASSESSMENT — COGNITIVE AND FUNCTIONAL STATUS - GENERAL
PERSONAL GROOMING: A LITTLE
CLIMB 3 TO 5 STEPS WITH RAILING: A LITTLE
DAILY ACTIVITIY SCORE: 19
DRESSING REGULAR LOWER BODY CLOTHING: A LITTLE
TOILETING: A LITTLE
MOBILITY SCORE: 20
MOVING TO AND FROM BED TO CHAIR: A LITTLE
WALKING IN HOSPITAL ROOM: A LITTLE
STANDING UP FROM CHAIR USING ARMS: A LITTLE
HELP NEEDED FOR BATHING: A LITTLE
DRESSING REGULAR UPPER BODY CLOTHING: A LITTLE

## 2025-04-16 ASSESSMENT — ACTIVITIES OF DAILY LIVING (ADL)
ADL_ASSISTANCE: INDEPENDENT
ADL_ASSISTANCE: INDEPENDENT
BATHING_ASSISTANCE: STAND BY
HOME_MANAGEMENT_TIME_ENTRY: 9

## 2025-04-16 ASSESSMENT — PAIN SCALES - GENERAL
PAINLEVEL_OUTOF10: 0 - NO PAIN
PAINLEVEL_OUTOF10: 0 - NO PAIN

## 2025-04-16 ASSESSMENT — PAIN - FUNCTIONAL ASSESSMENT
PAIN_FUNCTIONAL_ASSESSMENT: 0-10
PAIN_FUNCTIONAL_ASSESSMENT: 0-10

## 2025-04-16 NOTE — CONSULTS
"Nutrition Assessement Note    Nutrition Assessment    Reason for Assessment: Admission nursing screening    Reason for Hospital Admission:  Marita Carvalho is a 84 y.o. female who is admitted for hypertensive urgency. Stopped taking her meds 2-3 months ago     Malnutrition Screening Tool (MST)  Have you recently lost weight without trying?: No  If yes, how much weight have you lost?: Unsure  Weight Loss Score: 0  Have you been eating poorly because of a decreased appetite?: No  Malnutrition Score: 0  Nutrition Screen  Stage 3 or 4 Pressure Injury or Multiple Non-Healing Wounds: No  Home Tube Feeding or Total Parenteral Nutrition (TPN): No  Dietitian Consult Needed: No    Medical History[1]   Surgical History[2]    Nutrition History:  Energy Intake: Good > 75 %  Food and Nutrient History: pt reports having a good appetite. eatings 3 meals daily with snacks between meals. states she has drank nutrition supplements in the past but no longer does. she has always been thin - reports being called a \"skinny rivka\" all her life. current wt less than usual reported. agreeable to try ensure at this time and encouraged her to replace one snack daily with a high kcal/protein supplement - pt agreeable.    Anthropometrics:  Ht: 160 cm (5' 3\"), Wt: (!) 40.4 kg (89 lb 1.1 oz), BMI: 15.78    Weight Change:  Daily Weight  04/16/25 : (!) 40.4 kg (89 lb 1.1 oz)  05/23/24 : (!) 44.4 kg (97 lb 14.2 oz)  09/09/22 : (!) 44.5 kg (98 lb)  06/29/21 : 45.8 kg (101 lb)    Weight History / % Weight Change: pt reports usual wt of 100-102#. states she never weighs herself. per wt hx, pt most recently with a 8# (8.2%) wt loss over ~13 months (5/23/24-4/16/25)    Nutrition Focused Physical Exam Findings:   Subcutaneous Fat Loss  Orbital Fat Pads: Severe (dark circles, hollowing and loose skin)  Buccal Fat Pads: Severe (hollow, sunken and narrow face)  Triceps: Severe (negligible fat tissue)    Muscle Wasting  Temporalis: Severe (hollowed scooping " depression)  Pectoralis (Clavicular Region): Severe (protruding prominent clavicle)  Deltoid/Trapezius: Severe (squared shoulders, acromion process prominent)  Interosseous: Severe (depressed area between thumb and forefinger)  Trapezius/Infraspinatus/Supraspinatus (Scapular Region): Severe (prominent visual scapula, depression between ribs, scapula or shoulder)    Physical Findings  Hair: Positive (some changes following chemo)    Nutrition Significant Labs:  Lab Results   Component Value Date    WBC 7.2 04/16/2025    HGB 13.6 04/16/2025    HCT 40.3 04/16/2025     04/16/2025    CHOL 226 (H) 03/19/2019    TRIG 82 03/19/2019    HDL 79 03/19/2019    ALT 27 04/15/2025    AST 27 04/15/2025     (L) 04/16/2025    K 3.5 04/16/2025     04/16/2025    CREATININE 0.70 04/16/2025    BUN 22 04/16/2025    CO2 26 04/16/2025    TSH 3.33 07/22/2020    HGBA1C 5.5 09/09/2022     Nutrition Specific Medications:  Scheduled Medications[3]  Continuous Medications[4]    Dietary Orders (From admission, onward)       Start     Ordered    04/16/25 1012  Oral nutritional supplements  Until discontinued        Comments: Vanilla   Question Answer Comment   Deliver with Breakfast    Deliver with Dinner    Select supplement: Ensure Plus High Protein        04/16/25 1012    04/16/25 0055  May Participate in Room Service  ( ROOM SERVICE MAY PARTICIPATE)  Once        Question:  .  Answer:  Yes    04/16/25 0054    04/15/25 2227  Adult diet Regular  Diet effective now        Question:  Diet type  Answer:  Regular    04/15/25 2230                     Estimated Needs:   Estimated Energy Needs  Total Energy Estimated Needs in 24 hours (kCal): 1416 kCal  Energy Estimated Needs per kg Body Weight in 24 hours (kCal/kg): 35 kCal/kg  Method for Estimating Needs: actual wt    Estimated Protein Needs  Total Protein Estimated Needs in 24 Hours (g): 61 g  Protein Estimated Needs per kg Body Weight in 24 Hours (g/kg): 1.5 g/kg  Method for  Estimating 24 Hour Protein Needs: actual wt    Estimated Fluid Needs  Method for Estimating 24 Hour Fluid Needs: 1 ml/kcal or per MD        Nutrition Diagnosis   Nutrition Diagnosis:  Malnutrition Diagnosis  Patient has Malnutrition Diagnosis: Yes  Diagnosis Status: New  Malnutrition Diagnosis: Severe malnutrition related to chronic disease or condition  Related to: physiological causes  As Evidenced by: severe subcutaneous fat loss and muscle wasting, non-significant yet undesirable 8# (8.2%) wt loss over ~13 months       Nutrition Interventions/Recommendations   Nutrition Interventions and Recommendations:  Nutrition Prescription: Nutrition prescription for oral nutrition    Nutrition Recommendations:  Individualized Nutrition Prescription Provided for : continue regular diet with vanilla ensure plus high protein BID to supplement po intake    Nutrition Interventions/Goals:   Food and/or Nutrient Delivery Interventions  Interventions: Meals and snacks, Medical food supplement  Meals and Snacks: General healthful diet  Goal: provide as ordered  Medical Food Supplement: Commercial beverage medical food supplement therapy  Goal: ensure plus high protein BID to provide 350 kcals and 20g protein each    Education Documentation  No documentation found.           Nutrition Monitoring and Evaluation   Monitoring/Evaluation:   Food/Nutrient Related History Monitoring  Monitoring and Evaluation Plan: Estimated Energy Intake  Estimated Energy Intake: Energy intake greater or equal to 75% of estimated energy needs    Anthropometric Measurements  Monitoring and Evaluation Plan: Body weight  Body Weight: Body weight - Maintain stable weight, Body weight - Promote weight restoration    Goal Status: New goal(s) identified    Follow Up  Time Spent (min): 30 minutes  Last Date of Nutrition Visit: 04/16/25  Nutrition Follow-Up Needed?: 3-5 days  Follow up Comment: 4/21/25          [1]   Past Medical History:  Diagnosis Date     Diffuse large B cell lymphoma     Hypertension    [2]   Past Surgical History:  Procedure Laterality Date    CT GUIDED IMAGING FOR NEEDLE PLACEMENT  7/17/2017    CT GUIDED IMAGING FOR NEEDLE PLACEMENT [facility]   [3] amLODIPine, 5 mg, oral, Daily  famotidine, 20 mg, oral, Daily   Or  famotidine, 20 mg, intravenous, Daily  losartan, 25 mg, oral, Daily  polyethylene glycol, 17 g, oral, Daily     [4]

## 2025-04-16 NOTE — CARE PLAN
Problem: Pain  Goal: Takes deep breaths with improved pain control throughout the shift  Outcome: Progressing  Goal: Turns in bed with improved pain control throughout the shift  Outcome: Progressing  Goal: Walks with improved pain control throughout the shift  Outcome: Progressing  Goal: Performs ADL's with improved pain control throughout shift  Outcome: Progressing  Goal: Participates in PT with improved pain control throughout the shift  Outcome: Progressing  Goal: Free from opioid side effects throughout the shift  Outcome: Progressing  Goal: Free from acute confusion related to pain meds throughout the shift  Outcome: Progressing   The patient's goals for the shift include      The clinical goals for the shift include Monitor BPs and vitals    Over the shift, the patient did not make progress toward the following goals. Barriers to progression include . Recommendations to address these barriers include .

## 2025-04-16 NOTE — ASSESSMENT & PLAN NOTE
Patient currently on nicardipine drip as discussed above  Held hydrochlorothiazide  Held amlodipine

## 2025-04-16 NOTE — H&P
History Of Present Illness  Marita Carvalho is a 84 y.o. female presenting with headaches.  Past medical history includes hypertension and currently prescribed amlodipine and hydrochlorothiazide.  She reports not taking this for the past 2 months due to no longer wanting to be on the medication.  She states that 2 days ago she began to have a headache.  She then woke up last night with an intense headache which has been persistent since the onset.  She described the headache as a throbbing, pressure sensation located in the front and back of her head. She denies any other symptoms occurring during the headaches.  She tried to treat the headache with Aleve with no significant relief of her symptoms.  She was eventually convinced by her friend to come to the emergency room for evaluation.    Blood pressure on arrival to the ED noted to be 214/78.  She was started back on her home medications of hydrochlorothiazide and and amlodipine.  Blood pressures began to trend upward reaching a peak of 243/69.  She was given hydralazine which reduced blood pressures down to 204/72.  She was started on a nicardipine drip in the ED prior to coming to the floor for admission.  CT scan of the head showing no acute intracranial abnormality.  Labs revealing slight elevation in troponin 14->16.     She was also found to be bradycardic in the ED and was given atropine.  EKG revealed 2:1 AV manuel block. ED staff spoke with Dr. Sloan who recommended no continue treatment due to patient being asymptomatic (see ED provider note).    Patient mated due to concerns for hypertensive urgency.       Past Medical History  She has a past medical history of Diffuse large B cell lymphoma and Hypertension.    Surgical History  She has a past surgical history that includes CT guided imaging for needle placement (7/17/2017).     Social History  She has no history on file for tobacco use, alcohol use, and drug use.    Family History  No family history on  file.     Allergies  Patient has no known allergies.    Review of Systems   Constitutional:  Negative for chills, fever and unexpected weight change.   HENT:  Negative for congestion, ear pain, hearing loss, rhinorrhea, sore throat and trouble swallowing.    Eyes:  Negative for visual disturbance.   Respiratory:  Negative for cough and shortness of breath.    Cardiovascular:  Negative for chest pain and leg swelling.   Gastrointestinal:  Negative for abdominal pain, constipation, diarrhea, nausea and vomiting.   Genitourinary:  Negative for dysuria and hematuria.   Musculoskeletal:  Negative for arthralgias and myalgias.   Skin:  Negative for rash.   Neurological:  Negative for dizziness, weakness and numbness.   Psychiatric/Behavioral:  Negative for dysphoric mood. The patient is not nervous/anxious.         Physical Exam  Constitutional:       General: She is not in acute distress.     Comments: Appears frail   Cardiovascular:      Rate and Rhythm: Normal rate. Rhythm irregular.      Heart sounds: Murmur (systolic) heard.   Pulmonary:      Effort: Pulmonary effort is normal.   Abdominal:      General: Abdomen is flat. There is no distension.      Palpations: Abdomen is soft. There is no mass.      Tenderness: There is no abdominal tenderness.   Musculoskeletal:      Right lower leg: Edema (Trace) present.      Left lower leg: Edema (Trace) present.   Neurological:      General: No focal deficit present.      Mental Status: She is alert.   Psychiatric:         Mood and Affect: Mood normal.          Last Recorded Vitals  BP (!) 189/56   Pulse 55   Temp 36.5 °C (97.7 °F) (Temporal)   Resp 15   Wt (!) 39.7 kg (87 lb 8.4 oz)   SpO2 96%     Relevant Results  Results for orders placed or performed during the hospital encounter of 04/15/25 (from the past 24 hours)   CBC and Auto Differential   Result Value Ref Range    WBC 6.0 4.4 - 11.3 x10*3/uL    nRBC 0.0 0.0 - 0.0 /100 WBCs    RBC 3.81 (L) 4.00 - 5.20 x10*6/uL     Hemoglobin 11.2 (L) 12.0 - 16.0 g/dL    Hematocrit 34.5 (L) 36.0 - 46.0 %    MCV 91 80 - 100 fL    MCH 29.4 26.0 - 34.0 pg    MCHC 32.5 32.0 - 36.0 g/dL    RDW 14.9 (H) 11.5 - 14.5 %    Platelets 225 150 - 450 x10*3/uL    Neutrophils % 79.4 40.0 - 80.0 %    Immature Granulocytes %, Automated 0.3 0.0 - 0.9 %    Lymphocytes % 15.4 13.0 - 44.0 %    Monocytes % 4.7 2.0 - 10.0 %    Eosinophils % 0.0 0.0 - 6.0 %    Basophils % 0.2 0.0 - 2.0 %    Neutrophils Absolute 4.78 1.60 - 5.50 x10*3/uL    Immature Granulocytes Absolute, Automated 0.02 0.00 - 0.50 x10*3/uL    Lymphocytes Absolute 0.93 0.80 - 3.00 x10*3/uL    Monocytes Absolute 0.28 0.05 - 0.80 x10*3/uL    Eosinophils Absolute 0.00 0.00 - 0.40 x10*3/uL    Basophils Absolute 0.01 0.00 - 0.10 x10*3/uL   Comprehensive metabolic panel   Result Value Ref Range    Glucose 119 (H) 74 - 99 mg/dL    Sodium 137 136 - 145 mmol/L    Potassium 3.4 (L) 3.5 - 5.3 mmol/L    Chloride 105 98 - 107 mmol/L    Bicarbonate 26 21 - 32 mmol/L    Anion Gap 9 (L) 10 - 20 mmol/L    Urea Nitrogen 22 6 - 23 mg/dL    Creatinine 0.65 0.50 - 1.05 mg/dL    eGFR 87 >60 mL/min/1.73m*2    Calcium 8.3 (L) 8.6 - 10.3 mg/dL    Albumin 3.4 3.4 - 5.0 g/dL    Alkaline Phosphatase 99 33 - 136 U/L    Total Protein 5.8 (L) 6.4 - 8.2 g/dL    AST 27 9 - 39 U/L    Bilirubin, Total 0.8 0.0 - 1.2 mg/dL    ALT 27 7 - 45 U/L   Troponin I, High Sensitivity, Initial   Result Value Ref Range    Troponin I, High Sensitivity 14 (H) 0 - 13 ng/L   Troponin, High Sensitivity, 1 Hour   Result Value Ref Range    Troponin I, High Sensitivity 16 (H) 0 - 13 ng/L      CT head wo IV contrast    Result Date: 4/15/2025  Interpreted By:  Aric Brewer, STUDY: CT HEAD WO IV CONTRAST;  4/15/2025 5:42 pm   INDICATION: Signs/Symptoms:headache.   COMPARISON: None.   ACCESSION NUMBER(S): [id number]   ORDERING CLINICIAN: KENYATTA ALSTON   TECHNIQUE: Axial noncontrast CT images of the head.   FINDINGS: BRAIN PARENCHYMA: Gray-white matter  interfaces are preserved. No mass, mass effect or midline shift. Mild deep and periventricular white matter hypodensities are nonspecific, but favored to represent chronic small vessel ischemic changes.   HEMORRHAGE: No acute intracranial hemorrhage. VENTRICLES and EXTRA-AXIAL SPACES: Mild volume loss with prominence of the ventricles and sulci. EXTRACRANIAL SOFT TISSUES: Within normal limits. PARANASAL SINUSES/MASTOIDS: The visualized paranasal sinuses and mastoid air cells are aerated. CALVARIUM: No depressed skull fracture. No destructive osseous lesion.   OTHER FINDINGS: Atherosclerotic calcification of the carotid siphons.       No acute intracranial abnormality.   Chronic changes as noted above.   MACRO: None   Signed by: Aric Brewer 4/15/2025 6:13 PM Dictation workstation:   [id number]        84-year-old female presenting with headaches.  Patient with history of hypertension and recently took herself off of hydrochlorothiazide and amlodipine.  Found to have systolics in the 200s in the ED.  Given home blood medications as well as hydralazine with no significant decline in her blood pressure.  Started on nicardipine drip.  Also found to have intermittent bradycardia with EKG showing 2:1 AV manuel block.  Cardiology to follow this outpatient.     Assessment/Plan   Assessment & Plan  Hypertensive urgency  Presenting with persistent headache likely secondary to hypertension also considering migraine versus tension headache.  Given amlodipine, hydrochlorothiazide and hydralazine in the ED with systolics remaining above 200.  Started on nicardipine will titrate to goal of around 165.  Consult to nephrology placed  Consulted cardiology placed  Will continue patient's home blood pressure medications.  Frequent blood pressure checks  Telemetry monitoring    Bradycardia  Found to have intermittent episodes of bradycardia in the ED.  Given atropine by ED staff  EKG showing 2:1 AV manuel block, to be stat consulted  cardiology reports that given patient asymptomatic, heart block can be worked up outpatient.    Essential hypertension  Patient currently on nicardipine drip as discussed above  Hold hydrochlorothiazide  Hold amlodipine    Protein calorie malnutrition (Multi)  Patient presenting with BMI 15.5.  Labs revealing low protein of 5.8.  Patient appearing frail on exam.  Consult placed to dietitian    Elevated troponin  Slight elevation 14->16  Cardiology already consulted as mentioned above.           AMILCAR GRANADOS

## 2025-04-16 NOTE — ASSESSMENT & PLAN NOTE
Presenting with persistent headache likely secondary to hypertension also considering migraine versus tension headache.  Given amlodipine, hydrochlorothiazide and hydralazine in the ED with systolics remaining above 200.  Started on nicardipine will titrate to goal of around 165.  Consult to nephrology   Consulted cardiology  Frequent blood pressure checks  Telemetry monitoring

## 2025-04-16 NOTE — CARE PLAN
The patient's goals for the shift include  eat meals     The clinical goals for the shift include Monitor BPs and vitals      Problem: Pain - Adult  Goal: Verbalizes/displays adequate comfort level or baseline comfort level  Outcome: Progressing     Problem: Safety - Adult  Goal: Free from fall injury  Outcome: Progressing     Problem: Discharge Planning  Goal: Discharge to home or other facility with appropriate resources  Outcome: Progressing     Problem: Chronic Conditions and Co-morbidities  Goal: Patient's chronic conditions and co-morbidity symptoms are monitored and maintained or improved  Outcome: Progressing     Problem: Nutrition  Goal: Nutrient intake appropriate for maintaining nutritional needs  Outcome: Progressing     Problem: Skin  Goal: Participates in plan/prevention/treatment measures  Outcome: Progressing  Goal: Prevent/manage excess moisture  Outcome: Progressing  Goal: Prevent/minimize sheer/friction injuries  Outcome: Progressing  Goal: Promote/optimize nutrition  Outcome: Progressing     Problem: Fall/Injury  Goal: Not fall by end of shift  Outcome: Progressing  Goal: Be free from injury by end of the shift  Outcome: Progressing  Goal: Verbalize understanding of personal risk factors for fall in the hospital  Outcome: Progressing  Goal: Verbalize understanding of risk factor reduction measures to prevent injury from fall in the home  Outcome: Progressing  Goal: Use assistive devices by end of the shift  Outcome: Progressing  Goal: Pace activities to prevent fatigue by end of the shift  Outcome: Progressing     Problem: Pain  Goal: Takes deep breaths with improved pain control throughout the shift  Outcome: Progressing  Goal: Turns in bed with improved pain control throughout the shift  Outcome: Progressing  Goal: Walks with improved pain control throughout the shift  Outcome: Progressing  Goal: Performs ADL's with improved pain control throughout shift  Outcome: Progressing  Goal:  Participates in PT with improved pain control throughout the shift  Outcome: Progressing  Goal: Free from opioid side effects throughout the shift  Outcome: Progressing  Goal: Free from acute confusion related to pain meds throughout the shift  Outcome: Progressing

## 2025-04-16 NOTE — CONSULTS
.Reason For Consult  Hypertensive urgency    History Of Present Illness  Marita Carvalho is a 84 y.o. female who is known to have a longstanding history of hypertension used to take antihypertensive medication in the past however she decided on her own to discontinue her medications few months ago, she also had a history of B-cell lymphoma in remission currently the patient has not been feeling well for few days prior to admission complaining of severe headache generalized weakness and not feeling right she came into the emergency room upon evaluation in the ER she was found to have severely elevated blood pressure 243/69 patient was started on nicardipine drip and also given IV hydralazine and admitted to the intensive care unit patient was started on amlodipine as well as losartan and that her blood pressure is much better she is off nicardipine drip she feels a whole lot better headache subsided she did have a CAT scan of the brain in the emergency room which did not show any acute process     Review of Systems  Review of Systems   Constitutional:  Positive for fatigue. Negative for chills and fever.   HENT:  Negative for sinus pressure, sore throat and tinnitus.    Eyes:  Negative for photophobia and visual disturbance.   Respiratory:  Negative for apnea, cough, shortness of breath and wheezing.    Cardiovascular:  Negative for chest pain, palpitations and leg swelling.   Gastrointestinal:  Negative for abdominal pain, blood in stool, constipation, diarrhea, nausea, rectal pain and vomiting.   Endocrine: Negative for cold intolerance, heat intolerance, polydipsia, polyphagia and polyuria.   Genitourinary:  Negative for decreased urine volume, dysuria, frequency, hematuria and urgency.   Musculoskeletal:  Negative for arthralgias, back pain, joint swelling, myalgias and neck pain.   Skin:  Negative for color change, pallor, rash and wound.   Neurological:  Positive for weakness and headaches. Negative for dizziness,  tremors, seizures, syncope, speech difficulty, light-headedness and numbness.   Hematological:  Negative for adenopathy. Does not bruise/bleed easily.   Psychiatric/Behavioral:  Negative for confusion, hallucinations, sleep disturbance and suicidal ideas.         Past Medical History  She has a past medical history of Diffuse large B cell lymphoma and Hypertension.    Surgical History  She has a past surgical history that includes CT guided imaging for needle placement (7/17/2017).     Social History  She reports that she has never smoked. She has never used smokeless tobacco. No history on file for alcohol use and drug use.    Family History  Family History[1]   Current Medications[2]   Allergies  Patient has no known allergies.         Physical Exam  Physical Exam  Constitutional:       General: She is not in acute distress.     Appearance: She is not toxic-appearing.   HENT:      Head: Normocephalic and atraumatic.   Eyes:      Extraocular Movements: Extraocular movements intact.      Pupils: Pupils are equal, round, and reactive to light.   Neck:      Vascular: No carotid bruit.   Cardiovascular:      Rate and Rhythm: Regular rhythm. Bradycardia present.   Pulmonary:      Effort: No respiratory distress.      Breath sounds: No stridor. No wheezing, rhonchi or rales.   Chest:      Chest wall: No tenderness.   Abdominal:      General: There is no distension.      Palpations: There is no mass.      Tenderness: There is no abdominal tenderness. There is no right CVA tenderness, left CVA tenderness or guarding.      Hernia: No hernia is present.   Musculoskeletal:         General: No swelling or tenderness.      Cervical back: No rigidity.      Right lower leg: No edema.      Left lower leg: No edema.   Lymphadenopathy:      Cervical: No cervical adenopathy.   Skin:     General: Skin is warm and dry.      Coloration: Skin is not jaundiced or pale.      Findings: No bruising or erythema.   Neurological:      General:  "No focal deficit present.      Mental Status: She is alert and oriented to person, place, and time.   Psychiatric:         Mood and Affect: Mood normal.         Behavior: Behavior normal.              I&O 24HR    Intake/Output Summary (Last 24 hours) at 4/16/2025 1008  Last data filed at 4/16/2025 0424  Gross per 24 hour   Intake 83.75 ml   Output --   Net 83.75 ml       Vitals 24HR  Heart Rate:  [41-66]   Temp:  [36.5 °C (97.7 °F)-37 °C (98.6 °F)]   Resp:  [15-19]   BP: (123-243)/(49-82)   Height:  [160 cm (5' 3\")]   Weight:  [39.7 kg (87 lb 8.4 oz)-40.4 kg (89 lb 1.1 oz)]   SpO2:  [95 %-100 %]     Relevant Results        Results for orders placed or performed during the hospital encounter of 04/15/25 (from the past 96 hours)   CBC and Auto Differential   Result Value Ref Range    WBC 6.0 4.4 - 11.3 x10*3/uL    nRBC 0.0 0.0 - 0.0 /100 WBCs    RBC 3.81 (L) 4.00 - 5.20 x10*6/uL    Hemoglobin 11.2 (L) 12.0 - 16.0 g/dL    Hematocrit 34.5 (L) 36.0 - 46.0 %    MCV 91 80 - 100 fL    MCH 29.4 26.0 - 34.0 pg    MCHC 32.5 32.0 - 36.0 g/dL    RDW 14.9 (H) 11.5 - 14.5 %    Platelets 225 150 - 450 x10*3/uL    Neutrophils % 79.4 40.0 - 80.0 %    Immature Granulocytes %, Automated 0.3 0.0 - 0.9 %    Lymphocytes % 15.4 13.0 - 44.0 %    Monocytes % 4.7 2.0 - 10.0 %    Eosinophils % 0.0 0.0 - 6.0 %    Basophils % 0.2 0.0 - 2.0 %    Neutrophils Absolute 4.78 1.60 - 5.50 x10*3/uL    Immature Granulocytes Absolute, Automated 0.02 0.00 - 0.50 x10*3/uL    Lymphocytes Absolute 0.93 0.80 - 3.00 x10*3/uL    Monocytes Absolute 0.28 0.05 - 0.80 x10*3/uL    Eosinophils Absolute 0.00 0.00 - 0.40 x10*3/uL    Basophils Absolute 0.01 0.00 - 0.10 x10*3/uL   Comprehensive metabolic panel   Result Value Ref Range    Glucose 119 (H) 74 - 99 mg/dL    Sodium 137 136 - 145 mmol/L    Potassium 3.4 (L) 3.5 - 5.3 mmol/L    Chloride 105 98 - 107 mmol/L    Bicarbonate 26 21 - 32 mmol/L    Anion Gap 9 (L) 10 - 20 mmol/L    Urea Nitrogen 22 6 - 23 mg/dL    " Creatinine 0.65 0.50 - 1.05 mg/dL    eGFR 87 >60 mL/min/1.73m*2    Calcium 8.3 (L) 8.6 - 10.3 mg/dL    Albumin 3.4 3.4 - 5.0 g/dL    Alkaline Phosphatase 99 33 - 136 U/L    Total Protein 5.8 (L) 6.4 - 8.2 g/dL    AST 27 9 - 39 U/L    Bilirubin, Total 0.8 0.0 - 1.2 mg/dL    ALT 27 7 - 45 U/L   Troponin I, High Sensitivity, Initial   Result Value Ref Range    Troponin I, High Sensitivity 14 (H) 0 - 13 ng/L   Electrocardiogram, 12-lead PRN ACS symptoms   Result Value Ref Range    Ventricular Rate 42 BPM    Atrial Rate 82 BPM    HI Interval 146 ms    QRS Duration 104 ms    QT Interval 686 ms    QTC Calculation(Bazett) 572 ms    P Axis 79 degrees    R Axis 95 degrees    T Axis 37 degrees    QRS Count 7 beats    Q Onset 223 ms    P Onset 150 ms    P Offset 201 ms    T Offset 566 ms    QTC Fredericia 609 ms   ECG 12 lead   Result Value Ref Range    Ventricular Rate 55 BPM    Atrial Rate 55 BPM    HI Interval 144 ms    QRS Duration 110 ms    QT Interval 602 ms    QTC Calculation(Bazett) 575 ms    P Axis 71 degrees    R Axis 90 degrees    T Axis 38 degrees    QRS Count 9 beats    Q Onset 219 ms    P Onset 147 ms    P Offset 197 ms    T Offset 520 ms    QTC Fredericia 584 ms   Troponin, High Sensitivity, 1 Hour   Result Value Ref Range    Troponin I, High Sensitivity 16 (H) 0 - 13 ng/L   CBC   Result Value Ref Range    WBC 7.2 4.4 - 11.3 x10*3/uL    nRBC 0.0 0.0 - 0.0 /100 WBCs    RBC 4.63 4.00 - 5.20 x10*6/uL    Hemoglobin 13.6 12.0 - 16.0 g/dL    Hematocrit 40.3 36.0 - 46.0 %    MCV 87 80 - 100 fL    MCH 29.4 26.0 - 34.0 pg    MCHC 33.7 32.0 - 36.0 g/dL    RDW 14.6 (H) 11.5 - 14.5 %    Platelets 288 150 - 450 x10*3/uL   Basic metabolic panel   Result Value Ref Range    Glucose 108 (H) 74 - 99 mg/dL    Sodium 133 (L) 136 - 145 mmol/L    Potassium 3.5 3.5 - 5.3 mmol/L    Chloride 100 98 - 107 mmol/L    Bicarbonate 26 21 - 32 mmol/L    Anion Gap 11 10 - 20 mmol/L    Urea Nitrogen 22 6 - 23 mg/dL    Creatinine 0.70 0.50 - 1.05  mg/dL    eGFR 85 >60 mL/min/1.73m*2    Calcium 9.2 8.6 - 10.3 mg/dL          Assessment/Plan     Imaging  CT head wo IV contrast  Result Date: 4/15/2025  No acute intracranial abnormality.   Chronic changes as noted above.   MACRO: None   Signed by: Aric Brewer 4/15/2025 6:13 PM Dictation workstation:   [id number]      Cardiology, Vascular, and Other Imaging  ECG 12 lead  Result Date: 4/16/2025  Sinus bradycardia with Premature atrial complexes Biatrial enlargement Rightward axis Incomplete right bundle branch block Left ventricular hypertrophy ( [place] product ) T wave abnormality, consider anterolateral ischemia Prolonged QT Abnormal ECG When compared with ECG of 15-APR-2025 19:14, (unconfirmed) Premature atrial complexes are now Present Sinus rhythm is no longer with 2nd degree AV block Confirmed by Dave Cherry ([id number]) on 4/16/2025 8:53:31 AM    Electrocardiogram, 12-lead PRN ACS symptoms  Result Date: 4/16/2025  Sinus rhythm with 2nd degree AV block with 2:1 AV conduction Possible Left atrial enlargement Rightward axis Incomplete right bundle branch block Septal infarct , age undetermined T wave abnormality, consider anterolateral ischemia Prolonged QT Abnormal ECG When compared with ECG of 23-MAY-2024 11:44, Sinus rhythm has replaced Junctional rhythm Nonspecific T wave abnormality now evident in Inferior leads T wave inversion now evident in Lateral leads QT has lengthened Confirmed by Dave Cherry ([id number]) on 4/16/2025 8:53:02 AM    Assessment:  Hypertensive urgency most likely has essential hypertension however she is noncompliant with her medications rule out secondary form of hypertension  Severe headache secondary to hypertension  Hodgkin lymphoma in remission  Bradycardia    Recommendations;  I agree with amlodipine and losartan is seen that her blood pressure is much improved  Duplex sound scanning of the renal arteries  Aldosterone level  Renin level  Metanephrine level    Thank you for your  consultation        Tereso Crews MDInpatient consult to [facility]  Consult performed by: Tereso Crews MD  Consult ordered by: Pollo Mosher MD             [1] No family history on file.  [2]   Current Facility-Administered Medications:     acetaminophen (Tylenol) tablet 650 mg, 650 mg, oral, q4h PRN **OR** acetaminophen (Tylenol) oral liquid 650 mg, 650 mg, oral, q4h PRN **OR** acetaminophen (Tylenol) suppository 650 mg, 650 mg, rectal, q4h PRN, Pollo Mosher MD    amLODIPine (Norvasc) tablet 5 mg, 5 mg, oral, Daily, Jaskaran Sloan DO    famotidine (Pepcid) tablet 20 mg, 20 mg, oral, Daily **OR** famotidine PF (Pepcid) injection 20 mg, 20 mg, intravenous, Daily, Pollo Mosher MD    losartan (Cozaar) tablet 25 mg, 25 mg, oral, Daily, Jaskaran Sloan DO    polyethylene glycol (Glycolax, Miralax) packet 17 g, 17 g, oral, Daily, Pollo Mosher MD

## 2025-04-16 NOTE — ASSESSMENT & PLAN NOTE
Found to have intermittent episodes of bradycardia in the ED.  Given atropine by ED staff  EKG showing 2:1 AV manuel block, to be stat consulted cardiology reports that given patient asymptomatic, heart block can be worked up outpatient.

## 2025-04-16 NOTE — ASSESSMENT & PLAN NOTE
Patient currently on nicardipine drip as discussed above  Hold hydrochlorothiazide  Hold amlodipine

## 2025-04-16 NOTE — PROGRESS NOTES
Evaluation/Treatment    Patient Name: Marita Carvalho  MRN: [id number]  Department: [facility] 4 ICU  Room: [address] [place], [place] [address]  Today's Date: 04/16/25  Time Calculation  Start Time: 0820  Stop Time: 0844  Time Calculation (min): 24 min       Assessment:  OT Assessment: 83 yo female who has not been taking BP meds x ~2 months duration admits with hypertensive urgency and a persistant headache. On eval, patient presents slightly below baseline functional status d/t impaired activity tolerance and generalized weakness.  Pt would benefit from acute OT services in order to maximize functional capacity and safety with mobility prior to returning home alone.  Do not anticipate need for OT skilled services following discharge at this time but will monitor progress and update recommendations accordingly.  Prognosis: Good  Barriers to Discharge Home: Physical needs, Caregiver assistance  Caregiver Assistance: Patient lives alone and/or does not have reliable caregiver assistance  Physical Needs: Intermittent mobility assistance needed, Intermittent ADL assistance needed  Evaluation/Treatment Tolerance: Patient limited by fatigue  Medical Staff Made Aware: Yes  End of Session Communication: Bedside nurse  End of Session Patient Position: Up in chair, Alarm on  OT Assessment Results: Decreased ADL status, Decreased endurance, Decreased functional mobility, Decreased IADLs  Prognosis: Good  Evaluation/Treatment Tolerance: Patient limited by fatigue  Medical Staff Made Aware: Yes  Strengths: Ability to acquire knowledge, Attitude of self, Premorbid level of function  Barriers to Participation: Comorbidities    Plan:  Treatment Interventions: ADL retraining, Functional transfer training, UE strengthening/ROM, Endurance training, Patient/family training, Equipment evaluation/education, Compensatory technique education  OT Frequency: 3 times per week  OT Discharge Recommendations: No OT needed after discharge  Equipment Recommended upon Discharge: Other  (comment) (TBD)  OT Recommended Transfer Status: Stand by assist  OT - OK to Discharge: Yes  Treatment Interventions: ADL retraining, Functional transfer training, UE strengthening/ROM, Endurance training, Patient/family training, Equipment evaluation/education, Compensatory technique education      Subjective   Current Problem:  1. Hypertensive urgency        2. Bradycardia  Transthoracic Echo (TTE) Complete    Transthoracic Echo (TTE) Complete      3. Atrioventricular block, second degree  Transthoracic Echo (TTE) Complete    Transthoracic Echo (TTE) Complete        General:   OT Received On: 04/16/25  General  Reason for Referral: Impaired ADLs, hypertensive urgency  Referred By: Dr Mosher  Past Medical History Relevant to Rehab: diffuse large B cell lymphoma, HTN, OA, OP  Family/Caregiver Present: No  Prior to Session Communication: Bedside nurse  Patient Position Received: Bed, 3 rail up, Alarm on  Preferred Learning Style: verbal, visual  General Comment: Cleared by nursing and agreeable for therapy     Precautions:  Hearing/Visual Limitations: hearing diminished (no aides); +corrective lenses  Medical Precautions: Fall precautions    Pain:  Pain Assessment  Pain Assessment: 0-10  0-10 (Numeric) Pain Score: 0 - No pain        Objective   Cognition:  Overall Cognitive Status: Within Functional Limits  Orientation Level: Oriented X4  Following Commands: Follows one step commands without difficulty    Home Living:  Type of Home: Condo  Lives With: Alone  Home Adaptive Equipment: None  Home Layout: Multi-level, Bed/bath upstairs, 1/2 bath on main level, Stairs to alternate level with rails  Alternate Level Stairs-Rails: Left  Alternate Level Stairs-Number of Steps: 11  Home Access: Stairs to enter without rails  Entrance Stairs-Rails: None  Entrance Stairs-Number of Steps: 1  Bathroom Shower/Tub: Tub/shower unit  Bathroom Toilet: Standard  Bathroom Equipment: Grab bars in shower  Home Living Comments: Pt lives  alone in a multi-level condo with bedroom upstairs and bathrooms on both floors.    Prior Function:  Level of [place]: Independent with ADLs and functional transfers, Independent with homemaking with ambulation  Receives Help From: Other (Comment) (Neighbors (family lives in [place]))  ADL Assistance: Independent  Homemaking Assistance: Independent  Ambulatory Assistance: Independent  Vocational: Retired (Teacher)  Leisure: enjoys crocheting and going for walks    ADL:  Eating Assistance: Independent  Eating Deficit:  (per clinical judgement)  Grooming Assistance: Stand by  Grooming Deficit: Setup (to wash hands standing sinkside)  Bathing Assistance: Stand by  Bathing Deficit: Setup, Supervision/safety (per clinical judgement)  UE Dressing Assistance: Stand by  UE Dressing Deficit: Setup (per clinical judgement)  LE Dressing Assistance: Stand by  LE Dressing Deficit: Setup, Supervision/safety (per clinical judgement)  Toileting Assistance with Device: Stand by  Toileting Deficit: Supervison/safety    Activity Tolerance:  Endurance: Decreased tolerance for upright activites    Bed Mobility/Transfers: Bed Mobility  Bed Mobility: Yes  Bed Mobility 1  Bed Mobility 1: Supine to sitting  Level of Assistance 1: Modified independent  Bed Mobility Comments 1: toward the right side of bed with head elevated ~40 degrees    Transfers  Transfer: Yes  Transfer 1  Transfer From 1: Bed to  Transfer to 1: Stand  Technique 1: Sit to stand  Transfer Level of Assistance 1: Close supervision  Transfers 2  Transfer From 2: Toilet to  Transfer to 2: Stand  Technique 2: Sit to stand, Stand to sit  Transfer Device 2:  (grab bar)  Transfer Level of Assistance 2: Close supervision  Transfers 3  Transfer From 3: Chair with arms to  Transfer to 3: Sit  Technique 3: Stand to sit  Transfer Level of Assistance 3: Close supervision    Functional Mobility:  Functional Mobility  Functional Mobility Performed:  (Contact guard assist in bedroom  and bathroom without an A.D. ~12'x2.  Pt initially unsteady but no overt LOB and improved with distance.)    Sensation:  Sensation Comment: Denies tingling/numbness in UEs    Strength:  Strength Comments: BUEs=~4-/5 grossly    Hand Function:  Hand Function  Gross Grasp: Functional  Coordination: Functional    Extremities: RUE   RUE : Within Functional Limits and LUE   LUE: Within Functional Limits    Outcome Measures: [facility] Daily Activity  Putting on and taking off regular lower body clothing: A little  Bathing (including washing, rinsing, drying): A little  Putting on and taking off regular upper body clothing: A little  Toileting, which includes using toilet, bedpan or urinal: A little  Taking care of personal grooming such as brushing teeth: A little  Eating Meals: None  Daily Activity - Total Score: 19    Education Documentation  ADL Training, taught by Kamla Ramírez OT at 4/16/2025  9:26 AM.  Learner: Patient  Readiness: Acceptance  Method: Demonstration  Response: Demonstrated Understanding, Needs Reinforcement  Comment: ADL and functional transfer/mobility retraining initiated    OP EDUCATION:  Education  Individual(s) Educated: Patient  Education Provided: Fall precautons, Risk and benefits of OT discussed with patient or other, POC discussed and agreed upon  Risk and Benefits Discussed with Patient/Caregiver/Other: yes  Patient/Caregiver Demonstrated Understanding: yes  Plan of Care Discussed and Agreed Upon: yes  Patient Response to Education: Patient/Caregiver Verbalized Understanding of Information    Goals:  Encounter Problems       Encounter Problems (Active)       OT Goals       ADLs (Progressing)       Start:  04/15/25    Expected End:  04/23/25       Patient will complete ADL tasks, with independent using AE need in order to increase patient's safety and independence with self-care tasks.           Functional transfers (Progressing)       Start:  04/15/25    Expected End:  04/23/25        Patient will complete functional transfers with independent using least restrictive device, in order to increase patient's safety and independence with daily tasks.           Activity tolerance (Progressing)       Start:  04/15/25    Expected End:  04/23/25       Patient will demonstrate the ability to participate in functional activity at least >/= 25 minutes in order to increase patient's safety and independence with daily tasks.

## 2025-04-16 NOTE — PROGRESS NOTES
04/16/25 1039   Discharge Planning   Living Arrangements Alone   Support Systems Family members;Friends/neighbors   Assistance Needed None   Type of Residence Private residence   Number of Stairs to Enter Residence 1   Number of Stairs Within Residence 11   Home or Post Acute Services None   Expected Discharge Disposition Home   Does the patient need discharge transport arranged? No        29-Apr-2022 16:05

## 2025-04-16 NOTE — ASSESSMENT & PLAN NOTE
Patient presenting with BMI 15.5.  Labs revealing low protein of 5.8.  Patient appearing frail on exam.  Consult placed to dietitian

## 2025-04-16 NOTE — H&P
History Of Present Illness  Marita Carvalho is a 84 y.o. female presenting with headaches.  Past medical history includes hypertension and currently prescribed amlodipine and hydrochlorothiazide.  She reports not taking this for the past 2 months due to no longer wanting to be on the medication.  She states that 2 days ago she began to have a headache.  She then woke up last night with an intense headache which has been persistent since the onset.  She described the headache as a throbbing, pressure sensation located in the front and back of her head. She denies any other symptoms occurring during the headaches.  She tried to treat the headache with Aleve with no significant relief of her symptoms.  She was eventually convinced by her friend to come to the emergency room for evaluation.    Blood pressure on arrival to the ED noted to be 214/78.  She was started back on her home medications of hydrochlorothiazide and and amlodipine.  Blood pressures began to trend upward reaching a peak of 243/69.  She was given hydralazine which reduced blood pressures down to 204/72.  She was started on a nicardipine drip in the ED prior to coming to the floor for admission.  CT scan of the head showing no acute intracranial abnormality.  Labs revealing slight elevation in troponin 14->16.     She was also found to be bradycardic in the ED and was given atropine.  EKG revealed 2:1 AV manuel block. ED staff spoke with Dr. Sloan who recommended no continue treatment due to patient being asymptomatic (see ED provider note).    Patient mated due to concerns for hypertensive urgency.       Past Medical History  She has a past medical history of Diffuse large B cell lymphoma and Hypertension.    Surgical History  She has a past surgical history that includes CT guided imaging for needle placement (7/17/2017).     Social History  She has no history on file for tobacco use, alcohol use, and drug use.    Family History  No family history on  file.     Allergies  Patient has no known allergies.    Review of Systems   Constitutional:  Negative for chills, fever and unexpected weight change.   HENT:  Negative for congestion, ear pain, hearing loss, rhinorrhea, sore throat and trouble swallowing.    Eyes:  Negative for visual disturbance.   Respiratory:  Negative for cough and shortness of breath.    Cardiovascular:  Negative for chest pain and leg swelling.   Gastrointestinal:  Negative for abdominal pain, constipation, diarrhea, nausea and vomiting.   Genitourinary:  Negative for dysuria and hematuria.   Musculoskeletal:  Negative for arthralgias and myalgias.   Skin:  Negative for rash.   Neurological:  Negative for dizziness, weakness and numbness.   Psychiatric/Behavioral:  Negative for dysphoric mood. The patient is not nervous/anxious.         Physical Exam  Constitutional:       General: She is not in acute distress.     Comments: Appears frail   Cardiovascular:      Rate and Rhythm: Normal rate. Rhythm irregular.      Heart sounds: Murmur (systolic) heard.   Pulmonary:      Effort: Pulmonary effort is normal.   Abdominal:      General: Abdomen is flat. There is no distension.      Palpations: Abdomen is soft. There is no mass.      Tenderness: There is no abdominal tenderness.   Musculoskeletal:      Right lower leg: Edema (Trace) present.      Left lower leg: Edema (Trace) present.   Neurological:      General: No focal deficit present.      Mental Status: She is alert.   Psychiatric:         Mood and Affect: Mood normal.          Last Recorded Vitals  BP (!) 189/56   Pulse 55   Temp 36.5 °C (97.7 °F) (Temporal)   Resp 15   Wt (!) 39.7 kg (87 lb 8.4 oz)   SpO2 96%     Relevant Results  Results for orders placed or performed during the hospital encounter of 04/15/25 (from the past 24 hours)   CBC and Auto Differential   Result Value Ref Range    WBC 6.0 4.4 - 11.3 x10*3/uL    nRBC 0.0 0.0 - 0.0 /100 WBCs    RBC 3.81 (L) 4.00 - 5.20 x10*6/uL     Hemoglobin 11.2 (L) 12.0 - 16.0 g/dL    Hematocrit 34.5 (L) 36.0 - 46.0 %    MCV 91 80 - 100 fL    MCH 29.4 26.0 - 34.0 pg    MCHC 32.5 32.0 - 36.0 g/dL    RDW 14.9 (H) 11.5 - 14.5 %    Platelets 225 150 - 450 x10*3/uL    Neutrophils % 79.4 40.0 - 80.0 %    Immature Granulocytes %, Automated 0.3 0.0 - 0.9 %    Lymphocytes % 15.4 13.0 - 44.0 %    Monocytes % 4.7 2.0 - 10.0 %    Eosinophils % 0.0 0.0 - 6.0 %    Basophils % 0.2 0.0 - 2.0 %    Neutrophils Absolute 4.78 1.60 - 5.50 x10*3/uL    Immature Granulocytes Absolute, Automated 0.02 0.00 - 0.50 x10*3/uL    Lymphocytes Absolute 0.93 0.80 - 3.00 x10*3/uL    Monocytes Absolute 0.28 0.05 - 0.80 x10*3/uL    Eosinophils Absolute 0.00 0.00 - 0.40 x10*3/uL    Basophils Absolute 0.01 0.00 - 0.10 x10*3/uL   Comprehensive metabolic panel   Result Value Ref Range    Glucose 119 (H) 74 - 99 mg/dL    Sodium 137 136 - 145 mmol/L    Potassium 3.4 (L) 3.5 - 5.3 mmol/L    Chloride 105 98 - 107 mmol/L    Bicarbonate 26 21 - 32 mmol/L    Anion Gap 9 (L) 10 - 20 mmol/L    Urea Nitrogen 22 6 - 23 mg/dL    Creatinine 0.65 0.50 - 1.05 mg/dL    eGFR 87 >60 mL/min/1.73m*2    Calcium 8.3 (L) 8.6 - 10.3 mg/dL    Albumin 3.4 3.4 - 5.0 g/dL    Alkaline Phosphatase 99 33 - 136 U/L    Total Protein 5.8 (L) 6.4 - 8.2 g/dL    AST 27 9 - 39 U/L    Bilirubin, Total 0.8 0.0 - 1.2 mg/dL    ALT 27 7 - 45 U/L   Troponin I, High Sensitivity, Initial   Result Value Ref Range    Troponin I, High Sensitivity 14 (H) 0 - 13 ng/L   Troponin, High Sensitivity, 1 Hour   Result Value Ref Range    Troponin I, High Sensitivity 16 (H) 0 - 13 ng/L      CT head wo IV contrast    Result Date: 4/15/2025  Interpreted By:  Aric Brewer, STUDY: CT HEAD WO IV CONTRAST;  4/15/2025 5:42 pm   INDICATION: Signs/Symptoms:headache.   COMPARISON: None.   ACCESSION NUMBER(S): [id number]   ORDERING CLINICIAN: KENYATTA ALSTON   TECHNIQUE: Axial noncontrast CT images of the head.   FINDINGS: BRAIN PARENCHYMA: Gray-white matter  interfaces are preserved. No mass, mass effect or midline shift. Mild deep and periventricular white matter hypodensities are nonspecific, but favored to represent chronic small vessel ischemic changes.   HEMORRHAGE: No acute intracranial hemorrhage. VENTRICLES and EXTRA-AXIAL SPACES: Mild volume loss with prominence of the ventricles and sulci. EXTRACRANIAL SOFT TISSUES: Within normal limits. PARANASAL SINUSES/MASTOIDS: The visualized paranasal sinuses and mastoid air cells are aerated. CALVARIUM: No depressed skull fracture. No destructive osseous lesion.   OTHER FINDINGS: Atherosclerotic calcification of the carotid siphons.       No acute intracranial abnormality.   Chronic changes as noted above.   MACRO: None   Signed by: Aric Brewer 4/15/2025 6:13 PM Dictation workstation:   [id number]             Assessment/Plan     84-year-old female presenting with headaches.  Patient with history of hypertension and recently took herself off of hydrochlorothiazide and amlodipine.  Found to have systolics in the 200s in the ED.  Given home blood medications as well as hydralazine with no significant decline in her blood pressure.  Started on nicardipine drip.  Also found to have intermittent bradycardia with EKG showing 2:1 AV manuel block.  Cardiology consulted  Assessment & Plan  Hypertensive urgency  Presenting with persistent headache likely secondary to hypertension also considering migraine versus tension headache.  Given amlodipine, hydrochlorothiazide and hydralazine in the ED with systolics remaining above 200.  Started on nicardipine will titrate to goal of around 165.  Consult to nephrology   Consulted cardiology  Frequent blood pressure checks  Telemetry monitoring    Bradycardia  Found to have intermittent episodes of bradycardia in the ED.  Given atropine by ED staff  EKG showing 2:1 AV manuel block, to be stat consulted cardiology reports that given patient asymptomatic, heart block can be worked up  outpatient.    Essential hypertension  Patient currently on nicardipine drip as discussed above  Held hydrochlorothiazide  Held amlodipine    Protein calorie malnutrition (Multi)  Patient presenting with BMI 15.5.  Labs revealing low protein of 5.8.  Patient appearing frail on exam.  Consult placed to dietitian    Elevated troponin  Slight elevation 14->16  Likely type II MI demand ischemia.    DVT prophylaxis: SCD    CODE STATUS: Full code       Pollo Mosher MD

## 2025-04-16 NOTE — ASSESSMENT & PLAN NOTE
Presenting with persistent headache likely secondary to hypertension also considering migraine versus tension headache.  Given amlodipine, hydrochlorothiazide and hydralazine in the ED with systolics remaining above 200.  Started on nicardipine will titrate to goal of around 165.  Consult to nephrology placed  Consulted cardiology placed  Will continue patient's home blood pressure medications.  Frequent blood pressure checks  Telemetry monitoring

## 2025-04-16 NOTE — PROGRESS NOTES
Marita Carvalho is a 84 y.o. female on day 1 of admission presenting with Hypertensive urgency.      Subjective   Patient presented with hypertension urgency  Patient was on nicardipine drip.  Nicardipine drip discontinued.         Objective     Last Recorded Vitals  BP (!) 151/49 (BP Location: Left arm, Patient Position: Lying)   Pulse 51   Temp 36.7 °C (98.1 °F) (Temporal)   Resp 15   Wt (!) 40.4 kg (89 lb 1.1 oz)   SpO2 95%   Intake/Output last 3 Shifts:    Intake/Output Summary (Last 24 hours) at 4/16/2025 0913  Last data filed at 4/16/2025 0424  Gross per 24 hour   Intake 83.75 ml   Output --   Net 83.75 ml       Admission Weight  Weight: (!) 39.7 kg (87 lb 8.4 oz) (04/15/25 1532)    Daily Weight  04/16/25 : (!) 40.4 kg (89 lb 1.1 oz)    Image Results  ECG 12 lead  Sinus bradycardia with Premature atrial complexes  Biatrial enlargement  Rightward axis  Incomplete right bundle branch block  Left ventricular hypertrophy ( Paul product )  T wave abnormality, consider anterolateral ischemia  Prolonged QT  Abnormal ECG  When compared with ECG of 15-APR-2025 19:14, (unconfirmed)  Premature atrial complexes are now Present  Sinus rhythm is no longer with 2nd degree AV block  Confirmed by Dave Cherry ([id number]) on 4/16/2025 8:53:31 AM  Electrocardiogram, 12-lead PRN ACS symptoms  Sinus rhythm with 2nd degree AV block with 2:1 AV conduction  Possible Left atrial enlargement  Rightward axis  Incomplete right bundle branch block  Septal infarct , age undetermined  T wave abnormality, consider anterolateral ischemia  Prolonged QT  Abnormal ECG  When compared with ECG of 23-MAY-2024 11:44,  Sinus rhythm has replaced Junctional rhythm  Nonspecific T wave abnormality now evident in Inferior leads  T wave inversion now evident in Lateral leads  QT has lengthened  Confirmed by Dave Cherry ([id number]) on 4/16/2025 8:53:02 AM      Physical Exam    General: I\cooperating during physical exam.  HEENT: Pupils are equal and reactive  to light and commendation , oral mucosa moist, no JVD   Cardiovascular: PMI nondisplaced  Lungs: Clear to auscultation bilaterally, no wheezing, no crackles, no dullness to percussion.  Abdomen: No hepatosplenomegaly appreciated, soft , not tender, positive bowel sounds, positive bowel movement.  Neuro: Alert and oriented x2, strength in upper and lower extremities , sensation intact.  Musculoskeletal: No swelling in lower extremities, no limitation in range of motion.  Vascular: Pulses are intact in upper and lower extremities  Skin: No petechiae, ecchymosis or other stigmata for dermatology disease.       Assessment and plan      Hypertension urgency.  Patient presented with headache hypertension  She was started on nicardipine.  Evaluated by cardiology  Patient was switched to amlodipine  Monitor close  Nephrology on the case    Bradycardia resolved  Concerning for AV block  Cardiology on the case  Monitor close    Protein malnutrition  BMI 15.7  Increase p.o. intake.    Monitor for 24 hours  Check CBC and BMP in AM.          Richie Warner MD

## 2025-04-16 NOTE — CONSULTS
Inpatient consult to Cardiology  Consult performed by: Jaskaran Sloan DO  Consult ordered by: Pollo Mosher MD  Reason for consult: bradycardia        History Of Present Illness:    Marita Carvalho is a 84 y.o. female presenting with headache.  Patient has a history of hypertension and previous B-cell lymphoma who presents to the emergency department with headache.  The patient states that she has been feeling very good this last year and stopped taking her antihypertensive medications approximately 2 to 3 months ago.  Over the last 2 days she then began to develop a significant headache.  She was taking Aleve but this did not seem to help the discomfort much.  She described her symptoms to a neighbor who then brought her to the emergency department where her blood pressure was found a markedly elevated at peak of 243/89.  She was given IV hydralazine placed on a nicardipine infusion and then admitted for further treatment and investigation.  Twelve-lead EKG did reveal sinus rhythm with 2-1 AV block.  The patient describes no lightheadedness or dizziness she has had no syncopal episodes.  He is also had no previous history of any cardiac disorders.  This morning she is resting comfortably states she feels much improved.  Still has a slight headache but mom much decreased compared to yesterday.       Last Recorded Vitals:  Vitals:    04/15/25 2300 04/15/25 2346 04/16/25 0300 04/16/25 0717   BP: (!) 186/64 (!) 189/56 123/57 (!) 151/49   BP Location:   Left arm Left arm   Patient Position:   Lying Lying   Pulse: (!) 49  66 51   Resp:   19 15   Temp:   37 °C (98.6 °F) 36.7 °C (98.1 °F)   TempSrc:   Temporal Temporal   SpO2:   96% 95%   Weight:   (!) 40.4 kg (89 lb 1.1 oz)    Height:           Last Labs:  CBC - 4/16/2025:  4:17 AM  7.2 13.6 288    40.3      CMP - 4/16/2025:  4:17 AM  9.2 5.8 27 --- 0.8   _ 3.4 27 99      PTT - No results in last year.  _   _ _     Troponin I, High Sensitivity   Date/Time Value Ref Range  Status   04/15/2025 08:02 PM 16 (H) 0 - 13 ng/L Final   04/15/2025 07:08 PM 14 (H) 0 - 13 ng/L Final     Hemoglobin A1C   Date/Time Value Ref Range Status   09/09/2022 12:50 PM 5.5 4.0 - 6.0 % Final     Comment:     Hemoglobin A1C levels are related to mean blood glucose during the   preceding 2-3 months. The relationship table below may be used as a   general guide. Each 1% increase in HGB A1C is a reflection of an   increase in mean glucose of approximately 30 mg/dl.   Reference: [employer], volume 29, supplement 1 Jan. 2006                        HGB A1C ................. Approx. Mean Glucose   _______________________________________________   6%   ...............................  120 mg/dl   7%   ...............................  150 mg/dl   8%   ...............................  180 mg/dl   9%   ...............................  210 mg/dl   10%  ...............................  240 mg/dl  Performed at [address] [address]     07/22/2020 12:29 PM 5.8 4.0 - 6.0 % Final     Comment:     Hemoglobin A1C levels are related to mean blood glucose during the   preceding 2-3 months. The relationship table below may be used as a   general guide. Each 1% increase in HGB A1C is a reflection of an   increase in mean glucose of approximately 30 mg/dl.   Reference: Diabetes [employer], volume 29, supplement 1 Jan. 2006                        HGB A1C ................. Approx. Mean Glucose   _______________________________________________   6%   ...............................  120 mg/dl   7%   ...............................  150 mg/dl   8%   ...............................  180 mg/dl   9%   ...............................  210 mg/dl   10%  ...............................  240 mg/dl  Performed at [address] [address]       LDL Calculated   Date/Time Value Ref Range Status   03/19/2019 10:32  (H) 65 - 130 MG/DL Final      Last I/O:  I/O last 3 completed shifts:  In: 83.8 (2.1  "mL/kg) [I.V.:83.8 (2.1 mL/kg)]  Out: - (0 mL/kg)   Weight: 40.4 kg     Past Cardiology Tests (Last 3 Years):  EKG:  ECG 12 lead 2024    Echo:  No results found for this or any previous visit from the past 1095 days.    Ejection Fractions:  No results found for: \"EF\"  Cath:  No results found for this or any previous visit from the past 1095 days.    Stress Test:  No results found for this or any previous visit from the past 1095 days.    Cardiac Imaging:  No results found for this or any previous visit from the past 1095 days.      Past Medical History:  She has a past medical history of Diffuse large B cell lymphoma and Hypertension.    Past Surgical History:  She has a past surgical history that includes CT guided imaging for needle placement (2017).      Social History:  She reports that she has never smoked. She has never used smokeless tobacco. No history on file for alcohol use and drug use.  Unmarried    Family History:  Patient has no history of her father medical conditions.  Mother  at age 69 but she also was unaware of any medical issues.     Allergies:  Patient has no known allergies.    Inpatient Medications:  Scheduled Medications[1]  PRN Medications[2]  Continuous Medications[3]  Outpatient Medications:  Current Outpatient Medications   Medication Instructions    amLODIPine (NORVASC) 10 mg, oral, Daily    hydroCHLOROthiazide (HYDRODIURIL) 25 mg, oral, Daily       Physical Exam:  Constitutional:       Appearance: Not in distress.   Eyes:      Conjunctiva/sclera: Conjunctivae normal.   HENT:    Mouth/Throat:      Pharynx: Oropharynx is clear.   Neck:      Vascular: No carotid bruit. JVD normal.   Pulmonary:      Breath sounds: Normal breath sounds. No wheezing. No rales.   Cardiovascular:      Regular rhythm.      Murmurs: There is no murmur.      No gallop.  No click. No rub.   Abdominal:      Palpations: Abdomen is soft.      Tenderness: There is no abdominal tenderness. "   Musculoskeletal:         General: No deformity. Neurological:      General: No focal deficit present.           Assessment/Plan     Uncontrolled hypertension  Bradycardia with 2-1 AV block  Medication noncompliance  History of B-cell lymphoma    4/16: As noted in the history of present illness patient stopped her antihypertensive agents 2 to 3 months ago.  She states that she was just feeling wonderful and really did not think she needed them.  She knows now but that was a mistake.  The patient was placed on IV nicardipine and blood pressure is very well controlled this morning.  Will plan on discontinuing the IV nicardipine and starting the patient on oral amlodipine as well as secondary agent as well.  The patient was with potassium is on the low side of normal thus hydrochlorothiazide may not be the best agent at this time, thus we will add losartan as an alternative antihypertensive.  The patient had 2-1 AV block on her initial EKG.  With a 2-1 block he cannot determine whether this is a Mobitz 1 or Mobitz 2 heart block.  Will continue to monitor her rhythm on telemetry, this morning there is no AV block identifiable.  The patient also describes no lightheadedness dizziness and no syncopal episodes.  Thus this would be an asymptomatic AV block as well.  We may need to place a patch monitor at the time of discharge if the rhythm remains unclear.  Thank you for the consultation we will follow with you.  Peripheral IV 04/15/25 20 G Right Antecubital (Active)   Site Assessment Clean;Dry;Intact 04/15/25 2100   Dressing Status Clean;Dry;Occlusive 04/15/25 2100   Number of days: 1       Code Status:  Full Code    I spent 60 minutes in the professional and overall care of this patient.        Jaskaran Sloan DO       [1]   Scheduled medications   Medication Dose Route Frequency    famotidine  20 mg oral Daily    Or    famotidine  20 mg intravenous Daily    polyethylene glycol  17 g oral Daily   [2]   PRN medications    Medication    acetaminophen    Or    acetaminophen    Or    acetaminophen   [3]   Continuous Medications   Medication Dose Last Rate    niCARdipine  2.5-15 mg/hr 2.5 mg/hr (04/16/25 4053)

## 2025-04-16 NOTE — PROGRESS NOTES
Physical Therapy Evaluation    Patient Name: Marita Carvalho  MRN: [id number]  Department: [facility] 4 ICU  Room: [facility]  Today's Date: 4/16/2025   Time Calculation  Start Time: 0847  Stop Time: 0901  Time Calculation (min): 14 min    Assessment/Plan   PT Assessment  PT Assessment Results: Decreased strength, Decreased endurance, Impaired balance, Decreased mobility, Decreased cognition, Impaired sensation  Rehab Prognosis: Excellent  Barriers to Discharge Home: No anticipated barriers  Evaluation/Treatment Tolerance: Patient tolerated treatment well  Medical Staff Made Aware: Yes  Strengths: Premorbid level of function  Barriers to Participation: Comorbidities  End of Session Communication: Bedside nurse  Assessment Comment: 84 year old female admits with HTN urgency, bradycardia, Malnutrition, and +Tp. On eval, she demonstrates impaired safe mobility warranting skilled PT care while in house. Anticipate Pt will return back to ind status prior to DC. No further acute care PT needs at DC as Pt is near functional baseline  End of Session Patient Position: Up in room, Alarm on  IP OR SWING BED PT PLAN  Inpatient or Swing Bed: Inpatient  PT Plan  Treatment/Interventions: Transfer training, Gait training, Stair training, Balance training, Strengthening, Endurance training, Therapeutic exercise, Therapeutic activity, Home exercise program  PT Plan: Ongoing PT  PT Frequency: 3 times per week  PT Discharge Recommendations: No PT needed after discharge  PT Recommended Transfer Status: Contact guard  PT - OK to Discharge: Yes    Subjective   General Visit Information:  General  Reason for Referral: Impaired Mobility-HTN Urgency  Referred By: Dr Mosher  Past Medical History Relevant to Rehab: diffuse large B cell lymphoma, HTN, OA, OP  Family/Caregiver Present: No  Prior to Session Communication: Bedside nurse  Patient Position Received: Up in chair, Alarm on  Preferred Learning Style: verbal, visual  General Comment: 84 year old  female admits with HTN urgency, bradycardia, Malnutrition, and +Tp.  Home Living:  Home Living  Type of Home: Condo  Lives With: Alone  Home Adaptive Equipment: None  Home Layout: Multi-level, Bed/bath upstairs, 1/2 bath on main level, Stairs to alternate level with rails  Alternate Level Stairs-Rails: Left  Alternate Level Stairs-Number of Steps: 11  Home Access: Stairs to enter without rails  Entrance Stairs-Rails: None  Entrance Stairs-Number of Steps: 1  Bathroom Shower/Tub: Tub/shower unit  Bathroom Toilet: Standard  Bathroom Equipment: Grab bars in shower  Prior Level of Function:  Prior Function Per Pt/Caregiver Report  Level of [place]: Independent with ADLs and functional transfers, Independent with homemaking with ambulation  ADL Assistance: Independent  Homemaking Assistance: Independent  Ambulatory Assistance: Independent  Prior Function Comments: denies fall hx  Precautions:  Precautions  Hearing/Visual Limitations: hearing diminished (no aides); +corrective lenses  Medical Precautions: Fall precautions      Date/Time Vitals Session Patient Position Pulse Resp SpO2 BP MAP (mmHg)    04/16/25 0847 Pre PT  --  94  --  95 %  131/83  --     04/16/25 0848 Post PT  --  83  --  95 %  127/57  --           Vital Signs Comment: ra     Objective   Pain:  Pain Assessment  Pain Assessment: 0-10  0-10 (Numeric) Pain Score: 0 - No pain  Cognition:  Cognition  Overall Cognitive Status: Impaired  Orientation Level: Disoriented to time  Insight: Mild    General Assessments:  Activity Tolerance  Endurance: Decreased tolerance for upright activites    Sensation  Sensation Comment: mild but intermittent tingling in B toes. New for Pt    Strength  Strength Comments: 4-/5 BLEs on MMT. Frail appearance  Functional Assessments:  Transfers  Transfer: Yes  Transfer 1  Transfer From 1: Chair with arms to  Transfer to 1: Stand  Technique 1: Sit to stand, Stand to sit  Transfer Level of Assistance 1: Close  supervision  Trials/Comments 1: CS for safety due to mild unsteadiness    Ambulation/Gait Training  Ambulation/Gait Training Performed: Yes  Ambulation/Gait Training 1  Surface 1: Level tile  Device 1: No device  Assistance 1: Close supervision, Loss of balance (Comment)  Comments/Distance (ft) 1: 100' can misstep to L (3x this trial) causing brief CGA for safety without LOB occuring    Outcome Measures:  [facility] Basic Mobility  Turning from your back to your side while in a flat bed without using bedrails: None  Moving from lying on your back to sitting on the side of a flat bed without using bedrails: None  Moving to and from bed to chair (including a wheelchair): A little  Standing up from a chair using your arms (e.g. wheelchair or bedside chair): A little  To walk in hospital room: A little  Climbing 3-5 steps with railing: A little  Basic Mobility - Total Score: 20    FSS-ICU  Ambulation: Walks >/ or equal to 50 feet with any assistance x1  Rolling: Complete independence  Sitting: Complete independence  Transfer Sit-to-Stand: Supervision or set-up only  Transfer Supine-to-Sit: Complete independence  Total Score: 28    Encounter Problems       Encounter Problems (Active)       Balance       Standing Balance (Progressing)       Start:  04/16/25    Expected End:  04/30/25       Pt will demonstrate good static standing balance to promote safe participation with out of bed activity, transfers, and mobility              Mobility       Ambulation (Progressing)       Start:  04/16/25    Expected End:  04/30/25       Pt will ambulate 150' independent assist with no device to promote safe home mobility             Between Floor Stair Negotiation (Progressing)       Start:  04/16/25    Expected End:  04/30/25       Pt will ascend/descend 11 stairs with rail(s) on L  with modified independent assist and least restrictive device to promote safe navigation at home between floors              PT Transfers       Sit to stand  (Progressing)       Start:  04/16/25    Expected End:  04/30/25       Pt will transfer sit to standing position with independent assist and no device to promote safe out of bed activity              Pain - Adult          Safety       Safe Mobility Techniques (Progressing)       Start:  04/16/25    Expected End:  04/30/25       Pt will correctly identify and demonstrate safe mobility techniques to reduce their risks for falls during their acute care stay                   Education Documentation  Mobility Training, taught by Jorge Fung, PT at 4/16/2025 10:45 AM.  Learner: Patient  Readiness: Acceptance  Method: Explanation, Demonstration  Response: Verbalizes Understanding  Comment: safe mobility techniques    Education Comments  No comments found.

## 2025-04-17 ENCOUNTER — APPOINTMENT (OUTPATIENT)
Dept: RADIOLOGY | Facility: HOSPITAL | Age: 85
End: 2025-04-17
Payer: COMMERCIAL

## 2025-04-17 ENCOUNTER — PHARMACY VISIT (OUTPATIENT)
Dept: PHARMACY | Facility: CLINIC | Age: 85
End: 2025-04-17
Payer: MEDICARE

## 2025-04-17 VITALS
RESPIRATION RATE: 11 BRPM | SYSTOLIC BLOOD PRESSURE: 159 MMHG | BODY MASS INDEX: 15.78 KG/M2 | OXYGEN SATURATION: 96 % | HEART RATE: 78 BPM | WEIGHT: 89.07 LBS | TEMPERATURE: 97.5 F | DIASTOLIC BLOOD PRESSURE: 56 MMHG | HEIGHT: 63 IN

## 2025-04-17 LAB
ALBUMIN SERPL BCP-MCNC: 3.5 G/DL (ref 3.4–5)
ANION GAP SERPL CALCULATED.3IONS-SCNC: 13 MMOL/L (ref 10–20)
BUN SERPL-MCNC: 31 MG/DL (ref 6–23)
CALCIUM SERPL-MCNC: 9.5 MG/DL (ref 8.6–10.3)
CHLORIDE SERPL-SCNC: 102 MMOL/L (ref 98–107)
CO2 SERPL-SCNC: 27 MMOL/L (ref 21–32)
CREAT SERPL-MCNC: 0.98 MG/DL (ref 0.5–1.05)
EGFRCR SERPLBLD CKD-EPI 2021: 57 ML/MIN/1.73M*2
ERYTHROCYTE [DISTWIDTH] IN BLOOD BY AUTOMATED COUNT: 15 % (ref 11.5–14.5)
GLUCOSE SERPL-MCNC: 90 MG/DL (ref 74–99)
HCT VFR BLD AUTO: 40 % (ref 36–46)
HGB BLD-MCNC: 13.2 G/DL (ref 12–16)
MCH RBC QN AUTO: 28.9 PG (ref 26–34)
MCHC RBC AUTO-ENTMCNC: 33 G/DL (ref 32–36)
MCV RBC AUTO: 88 FL (ref 80–100)
NRBC BLD-RTO: 0 /100 WBCS (ref 0–0)
PHOSPHATE SERPL-MCNC: 3.1 MG/DL (ref 2.5–4.9)
PLATELET # BLD AUTO: 297 X10*3/UL (ref 150–450)
POTASSIUM SERPL-SCNC: 3.8 MMOL/L (ref 3.5–5.3)
RBC # BLD AUTO: 4.56 X10*6/UL (ref 4–5.2)
SODIUM SERPL-SCNC: 138 MMOL/L (ref 136–145)
WBC # BLD AUTO: 6.2 X10*3/UL (ref 4.4–11.3)

## 2025-04-17 PROCEDURE — 80069 RENAL FUNCTION PANEL: CPT | Performed by: INTERNAL MEDICINE

## 2025-04-17 PROCEDURE — 99239 HOSP IP/OBS DSCHRG MGMT >30: CPT | Performed by: INTERNAL MEDICINE

## 2025-04-17 PROCEDURE — RXMED WILLOW AMBULATORY MEDICATION CHARGE

## 2025-04-17 PROCEDURE — 93975 VASCULAR STUDY: CPT

## 2025-04-17 PROCEDURE — 36415 COLL VENOUS BLD VENIPUNCTURE: CPT | Performed by: INTERNAL MEDICINE

## 2025-04-17 PROCEDURE — 2500000004 HC RX 250 GENERAL PHARMACY W/ HCPCS (ALT 636 FOR OP/ED): Performed by: STUDENT IN AN ORGANIZED HEALTH CARE EDUCATION/TRAINING PROGRAM

## 2025-04-17 PROCEDURE — 2500000001 HC RX 250 WO HCPCS SELF ADMINISTERED DRUGS (ALT 637 FOR MEDICARE OP): Performed by: INTERNAL MEDICINE

## 2025-04-17 PROCEDURE — 99233 SBSQ HOSP IP/OBS HIGH 50: CPT | Performed by: INTERNAL MEDICINE

## 2025-04-17 PROCEDURE — 76770 US EXAM ABDO BACK WALL COMP: CPT | Performed by: STUDENT IN AN ORGANIZED HEALTH CARE EDUCATION/TRAINING PROGRAM

## 2025-04-17 PROCEDURE — 85027 COMPLETE CBC AUTOMATED: CPT | Performed by: STUDENT IN AN ORGANIZED HEALTH CARE EDUCATION/TRAINING PROGRAM

## 2025-04-17 PROCEDURE — 93975 VASCULAR STUDY: CPT | Performed by: STUDENT IN AN ORGANIZED HEALTH CARE EDUCATION/TRAINING PROGRAM

## 2025-04-17 PROCEDURE — 2500000001 HC RX 250 WO HCPCS SELF ADMINISTERED DRUGS (ALT 637 FOR MEDICARE OP): Performed by: STUDENT IN AN ORGANIZED HEALTH CARE EDUCATION/TRAINING PROGRAM

## 2025-04-17 RX ORDER — AMLODIPINE BESYLATE 5 MG/1
5 TABLET ORAL DAILY
Qty: 30 TABLET | Refills: 0 | Status: SHIPPED | OUTPATIENT
Start: 2025-04-18 | End: 2025-05-18

## 2025-04-17 RX ORDER — LOSARTAN POTASSIUM 25 MG/1
25 TABLET ORAL DAILY
Qty: 30 TABLET | Refills: 0 | Status: SHIPPED | OUTPATIENT
Start: 2025-04-18 | End: 2025-05-18

## 2025-04-17 RX ADMIN — FAMOTIDINE 20 MG: 20 TABLET, FILM COATED ORAL at 09:52

## 2025-04-17 RX ADMIN — POLYETHYLENE GLYCOL 3350 17 G: 17 POWDER, FOR SOLUTION ORAL at 09:52

## 2025-04-17 RX ADMIN — AMLODIPINE BESYLATE 5 MG: 5 TABLET ORAL at 09:52

## 2025-04-17 RX ADMIN — LOSARTAN POTASSIUM 25 MG: 25 TABLET, FILM COATED ORAL at 09:52

## 2025-04-17 ASSESSMENT — PAIN SCALES - GENERAL: PAINLEVEL_OUTOF10: 0 - NO PAIN

## 2025-04-17 ASSESSMENT — PAIN - FUNCTIONAL ASSESSMENT: PAIN_FUNCTIONAL_ASSESSMENT: 0-10

## 2025-04-17 NOTE — CARE PLAN
The patient's goals for the shift include  go home    The clinical goals for the shift include remain hemodynamically stable      Problem: Pain - Adult  Goal: Verbalizes/displays adequate comfort level or baseline comfort level  Outcome: Progressing     Problem: Safety - Adult  Goal: Free from fall injury  Outcome: Progressing     Problem: Discharge Planning  Goal: Discharge to home or other facility with appropriate resources  Outcome: Progressing     Problem: Chronic Conditions and Co-morbidities  Goal: Patient's chronic conditions and co-morbidity symptoms are monitored and maintained or improved  Outcome: Progressing     Problem: Nutrition  Goal: Nutrient intake appropriate for maintaining nutritional needs  Outcome: Progressing     Problem: Skin  Goal: Participates in plan/prevention/treatment measures  Outcome: Progressing  Goal: Prevent/manage excess moisture  Outcome: Progressing  Goal: Prevent/minimize sheer/friction injuries  Outcome: Progressing  Goal: Promote/optimize nutrition  Outcome: Progressing     Problem: Fall/Injury  Goal: Not fall by end of shift  Outcome: Progressing  Goal: Be free from injury by end of the shift  Outcome: Progressing  Goal: Verbalize understanding of personal risk factors for fall in the hospital  Outcome: Progressing  Goal: Verbalize understanding of risk factor reduction measures to prevent injury from fall in the home  Outcome: Progressing  Goal: Use assistive devices by end of the shift  Outcome: Progressing  Goal: Pace activities to prevent fatigue by end of the shift  Outcome: Progressing     Problem: Pain  Goal: Takes deep breaths with improved pain control throughout the shift  Outcome: Progressing  Goal: Turns in bed with improved pain control throughout the shift  Outcome: Progressing  Goal: Walks with improved pain control throughout the shift  Outcome: Progressing  Goal: Performs ADL's with improved pain control throughout shift  Outcome: Progressing  Goal:  Participates in PT with improved pain control throughout the shift  Outcome: Progressing  Goal: Free from opioid side effects throughout the shift  Outcome: Progressing  Goal: Free from acute confusion related to pain meds throughout the shift  Outcome: Progressing

## 2025-04-17 NOTE — DISCHARGE SUMMARY
Discharge Diagnosis  Hypertensive urgency    Issues Requiring Follow-Up  Pretension  Mobitz 2 second-degree AV block    Discharge Meds     Medication List      START taking these medications     losartan 25 mg tablet; Commonly known as: Cozaar; Take 1 tablet (25 mg)   by mouth once daily.; Start taking on: April 18, 2025     CHANGE how you take these medications     amLODIPine 5 mg tablet; Commonly known as: Norvasc; Take 1 tablet (5 mg)   by mouth once daily. Hold for systolic blood pressure less than 100 mmhg;   Start taking on: April 18, 2025; What changed: medication strength, how   much to take, additional instructions     STOP taking these medications     hydroCHLOROthiazide 25 mg tablet; Commonly known as: HYDRODiuril       Test Results Pending At Discharge  Pending Labs       Order Current Status    Aldosterone, Serum In process    Metanephrines Plasma In process    Renin Activity In process            Hospital Course   Patient is an 84 years old  female with past medical history of hypertension on amlodipine and hydrochlorothiazide.  Patient stopped to take medication about 2 months ago.  Patient presented to [facility] ER complaining of headache.  CT head done in ER did not reveal acute intracranial finding.  Patient was admitted to hospital for further evaluation treatment.  Patient was treated for hypertension urgency.  She was started on nicardipine patient was started on Norvasc.  She was evaluated by Dr. Sloan from cardiology services.  There was concerning for second-degree AV block.  She was started on Norvasc and losartan.  I advised the patient to be compliant with medication.  Patient is clinically hemodynamically stable.  Discussed in detail with cardiology on the case.  Patient has Mobitz 2 second-degree AV block.  Okay to get discharged from cardiology.  Patient was advised to schedule appointment with Dr. Somers to be evaluated for pacemaker implant and Dr. Sloan.  Patient is  clinically and hemodynamically stable to get discharged today   okay to to DC home per cardiology    Pertinent Physical Exam At Time of Discharge  Physical Exam  General: In non acute distress, cooperating during physical exam.  HEENT: Pupils are equal and reactive to light and commendation , oral mucosa moist, no JVD oral mucosa is moist.  Cardiovascular: PMI nondisplaced  Lungs: Clear to auscultation bilaterally, no wheezing, no crackles, no dullness to percussion.  Abdomen: No hepatosplenomegaly appreciated, soft , not tender, positive bowel sounds, positive bowel movement.  Neuro: Alert and oriented x2, strength in upper and lower extremities , sensation intact.  Musculoskeletal: No swelling in lower extremities, no limitation in range of motion.  Vascular: Pulses are intact in upper and lower extremities  Skin: No petechiae, ecchymosis or other stigmata for dermatology disease.   Outpatient Follow-Up  No future appointments.    Follow-up with in 2 weeks  Scheduled to see Dr. Somers in 1 week    Time spent discharging patient 38 minutes  Richie Warner MD   Alert-The patient is alert, awake and responds to voice. The patient is oriented to time, place, and person. The triage nurse is able to obtain subjective information.

## 2025-04-17 NOTE — PROGRESS NOTES
04/17/25 0901   Discharge Planning   Home or Post Acute Services None  (Patient denies needs at d/c.)   Expected Discharge Disposition Home   Does the patient need discharge transport arranged? No

## 2025-04-17 NOTE — PROGRESS NOTES
"Marita Carvalho is a 84 y.o. female on day 2 of admission presenting with Hypertensive urgency.    Subjective   Patient states she feels well.  Denies any lightheadedness or dizziness.  She has not had any syncopal episodes.  Nursing states she is walking to and from the bathroom without any difficulties whatsoever.       Objective     Physical Exam  Eyes:      Conjunctiva/sclera: Conjunctivae normal.   Cardiovascular:      Rate and Rhythm: Normal rate and regular rhythm.      Heart sounds:      No gallop.   Pulmonary:      Breath sounds: Normal breath sounds. No wheezing, rhonchi or rales.   Abdominal:      Palpations: Abdomen is soft.   Neurological:      General: No focal deficit present.      Mental Status: She is alert.       Constitutional:       Appearance: Not in distress.   Eyes:      Conjunctiva/sclera: Conjunctivae normal.   Neck:      Vascular: JVD normal.   Pulmonary:      Breath sounds: Normal breath sounds. No wheezing. No rhonchi. No rales.   Cardiovascular:      Normal rate. Regular rhythm.      Murmurs: There is no murmur.      No gallop.  No click. No rub.   Abdominal:      Palpations: Abdomen is soft.   Neurological:      General: No focal deficit present.      Mental Status: Alert.        Last Recorded Vitals  Blood pressure 145/69, pulse 87, temperature 36.7 °C (98.1 °F), temperature source Temporal, resp. rate 18, height 1.6 m (5' 3\"), weight (!) 40.4 kg (89 lb 1.1 oz), SpO2 95%.  Intake/Output last 3 Shifts:  I/O last 3 completed shifts:  In: 434.2 (10.7 mL/kg) [P.O.:300; I.V.:134.2 (3.3 mL/kg)]  Out: - (0 mL/kg)   Weight: 40.4 kg     Relevant Results            This patient currently has cardiac telemetry ordered; if you would like to modify or discontinue the telemetry order, click here to go to the orders activity to modify/discontinue the order.      Results for orders placed or performed during the hospital encounter of 04/15/25 (from the past 24 hours)   Transthoracic Echo (TTE) Complete "   Result Value Ref Range    AV pk pooja 1.79 m/s    LVOT diam 1.70 cm    MV E/A ratio 0.65     Tricuspid annular plane systolic excursion 2.3 cm    LV Biplane EF 65 %    LV EF 68 %    RV free wall pk S' 11.30 cm/s    RVSP 27.8 mmHg    LVIDd 3.65 cm    AV pk grad 13 mmHg    Aortic Valve Area by Continuity of Peak Velocity 2.43 cm2    LV A4C EF 64.1    CBC   Result Value Ref Range    WBC 6.2 4.4 - 11.3 x10*3/uL    nRBC 0.0 0.0 - 0.0 /100 WBCs    RBC 4.56 4.00 - 5.20 x10*6/uL    Hemoglobin 13.2 12.0 - 16.0 g/dL    Hematocrit 40.0 36.0 - 46.0 %    MCV 88 80 - 100 fL    MCH 28.9 26.0 - 34.0 pg    MCHC 33.0 32.0 - 36.0 g/dL    RDW 15.0 (H) 11.5 - 14.5 %    Platelets 297 150 - 450 x10*3/uL   Renal function panel   Result Value Ref Range    Glucose 90 74 - 99 mg/dL    Sodium 138 136 - 145 mmol/L    Potassium 3.8 3.5 - 5.3 mmol/L    Chloride 102 98 - 107 mmol/L    Bicarbonate 27 21 - 32 mmol/L    Anion Gap 13 10 - 20 mmol/L    Urea Nitrogen 31 (H) 6 - 23 mg/dL    Creatinine 0.98 0.50 - 1.05 mg/dL    eGFR 57 (L) >60 mL/min/1.73m*2    Calcium 9.5 8.6 - 10.3 mg/dL    Phosphorus 3.1 2.5 - 4.9 mg/dL    Albumin 3.5 3.4 - 5.0 g/dL   Vascular US Renal Artery Duplex Complete   Result Value Ref Range    BSA 1.34 m2                Assessment & Plan  Hypertensive urgency    Essential hypertension    Protein calorie malnutrition (Multi)    Bradycardia    Elevated troponin    Uncontrolled hypertension  Bradycardia with 2-1 AV block, Mobitz 2 second degree AV block  Medication noncompliance  History of B-cell lymphoma     4/16: As noted in the history of present illness patient stopped her antihypertensive agents 2 to 3 months ago.  She states that she was just feeling wonderful and really did not think she needed them.  She knows now but that was a mistake.  The patient was placed on IV nicardipine and blood pressure is very well controlled this morning.  Will plan on discontinuing the IV nicardipine and starting the patient on oral  amlodipine as well as secondary agent as well.  The patient was with potassium is on the low side of normal thus hydrochlorothiazide may not be the best agent at this time, thus we will add losartan as an alternative antihypertensive.  The patient had 2-1 AV block on her initial EKG.  With a 2-1 block he cannot determine whether this is a Mobitz 1 or Mobitz 2 heart block.  Will continue to monitor her rhythm on telemetry, this morning there is no AV block identifiable.  The patient also describes no lightheadedness dizziness and no syncopal episodes.  Thus this would be an asymptomatic AV block as well.  We may need to place a patch monitor at the time of discharge if the rhythm remains unclear.  Thank you for the consultation we will follow with you.    4/17: Patient essentially asymptomatic from a cardiac standpoint.  She describes no lightheadedness or dizziness.  She has had no syncopal episodes.  I reviewed telemetry and she does have episodes of what appears to be Mobitz 2 second-degree AV block.  Given this degree of AV block I think the patient should be considered for pacemaker therapy.  But she is asymptomatic at this point in time.  I would thus okay her discharge home and then set up a follow-up appointment with Dr. Somers to consider pacemaker insertion.  Would certainly avoid all AV manuel blocking agents.  Blood pressures improved significantly.  Will continue the low-dose amlodipine 5 mg and losartan 25 mg as her initial antihypertensive agents.  Will have the patient follow-up with myself for titration of the meds in the future.  Would okay her discharge home and then follow-up with both Dr. Somers and myself.      I spent 25 minutes in the professional and overall care of this patient.      Jaskaran Sloan, DO

## 2025-04-18 ENCOUNTER — TELEPHONE (OUTPATIENT)
Facility: CLINIC | Age: 85
End: 2025-04-18

## 2025-04-18 NOTE — DOCUMENTATION CLARIFICATION NOTE
PATIENT:               HARRIS TURNER  ACCT #:                  [id number]  MRN:                       [id number]  :                       1940  ADMIT DATE:       4/15/2025 3:30 PM  DISCH DATE:        2025 1:47 PM  RESPONDING PROVIDER #:        [id number]          PROVIDER RESPONSE TEXT:    Non-ischemic myocardial injury    CDI QUERY TEXT:    Clarification    Instruction:    Based on your assessment of the patient and the clinical information, please provide the requested documentation by clicking on the appropriate radio button and enter any additional information if prompted.    Question: Is there a diagnosis indicative of the patient elevated Troponins and symptoms    When answering this query, please exercise your independent professional judgment. The fact that a question is being asked, does not imply that any particular answer is desired or expected.    The patient's clinical indicators include:  Clinical Information: 84y.o. F admitted with HTN urgency, persistent HA, bradycardia, protein calorie malnutrition & elevated Troponins.    4/15 ED Provider: States that she stopped taking her blood pressure medication a few months ago because she felt great and did not feel that she needed it anymore. Diagnoses: Hypertensive urgency, bradycardia, 2-1 AV block     H&P: Hypertensive urgency, presenting with persistent headache likely secondary to hypertension also considering migraine versus tension headache. Found to have intermittent episodes of bradycardia in the ED. Elevated troponin, slight elevation, likely type II MI demand ischemia.     Cards Consult: Uncontrolled hypertension, Bradycardia with 2-1 AV block, medication noncompliance. History of B-cell lymphoma. The patient had 2-1 AV block on her initial EKG.    Clinical Indicators:  4/15 at 1908 Troponin I: 14  4/15 at 1908 Troponin I: 16     TTE:  1.The left ventricular systolic function is normal, with a visually estimated ejection  fraction of 65-70%.  2.Spectral Doppler shows a Grade I (impaired relaxation pattern) of left ventricular diastolic filling with normal left atrial filling pressure.  3.There is normal right ventricular global systolic function.  4.Mild aortic valve regurgitation.    Treatment:  4/15 Nicardipine gtt  4/15 Atropine 0.4mg IV x1 dose  4/15 Hydralazine 5mg IV x1 dose  4/15 HCTZ 25mg po x1 dose  4/15-4/17 Amlodipine 5-10mg po x3 doses  4/16-4/17 Losartan 25mg po x2 doses    Risk Factors: HTN, Diffuse large B cell lymphoma  Options provided:  -- Type 2 MI  -- Non-ischemic myocardial injury  -- Other - I will add my own diagnosis  -- Refer to Clinical Documentation Reviewer    Query created by: Aretha Workman on 4/17/2025 10:58 AM      Electronically signed by:  AUDREY COPELAND MD 4/18/2025 1:57 AM

## 2025-04-18 NOTE — DOCUMENTATION CLARIFICATION NOTE
PATIENT:               HARRIS TURNER  ACCT #:                  [id number]  MRN:                       [id number]  :                       1940  ADMIT DATE:       4/15/2025 3:30 PM  DISCH DATE:        2025 1:47 PM  RESPONDING PROVIDER #:        [id number]          PROVIDER RESPONSE TEXT:    I agree with the dieticians diagnosis of Severe Malnutrition on 25    CDI QUERY TEXT:    Clarification    Instruction:    Based on your assessment of the patient and the clinical information, please provide the requested documentation by clicking on the appropriate radio button and enter any additional information if prompted.    Question: Please further clarify this patient nutritional status as    When answering this query, please exercise your independent professional judgment. The fact that a question is being asked, does not imply that any particular answer is desired or expected.    The patient's clinical indicators include:  Clinical Information: 84y.o. F admitted with HTN urgency, persistent HA, bradycardia, protein calorie malnutrition & elevated Troponin.     H&P: Protein calorie malnutrition, patient presenting with BMI 15.5. Labs revealing low protein of 5.8. Patient appearing frail on exam.     Dietician Consult: Severe malnutrition related to chronic disease or condition related to physiological causes as evidenced by: severe subcutaneous fat loss and muscle wasting, non-significant yet undesirable 8 lb(8.2%) wt loss over approximately 13 months     PN: Protein malnutrition, BMI 15.7    Clinical Indicators:  BMI: 15.7    4/15 K 3.4, Na 137, Ca 8.3, Protein 5.8   K 3.5, Na 133, Ca 9.2   K 3.8, Na 138, Ca 9.5    Treatment:   Dietician Consult: Individualized Nutrition Prescription Provided for: continue regular diet with vanilla ensure plus high protein BID to supplement po intake. Goal: ensure plus high protein BID to provide 350 kcals and 20g protein each    Risk Factors: Diffuse  large B cell lymphoma, medication noncompliance  Options provided:  -- I agree with the dieticians diagnosis of Severe Malnutrition on 4/16/25  -- I disagree with the dieticians diagnosis of Severe Malnutrition  -- Other - I will add my own diagnosis  -- Refer to Clinical Documentation Reviewer    Query created by: Aretha Workman on 4/17/2025 11:01 AM      Electronically signed by:  AUDREY COPELAND MD 4/18/2025 1:57 AM

## 2025-04-19 LAB — ALDOSTERONE IN SERUM: 3.5 NG/DL

## 2025-04-20 LAB
ANNOTATION COMMENT IMP: ABNORMAL
METANEPHS SERPL-SCNC: 0.41 NMOL/L (ref 0–0.49)
NORMETANEPH FREE SERPL-SCNC: 1.29 NMOL/L (ref 0–0.89)
RENIN PLAS-CCNC: 0.7 NG/ML/HR

## 2025-05-14 ENCOUNTER — OFFICE VISIT (OUTPATIENT)
Facility: CLINIC | Age: 85
End: 2025-05-14
Payer: COMMERCIAL

## 2025-05-14 VITALS
DIASTOLIC BLOOD PRESSURE: 82 MMHG | WEIGHT: 100 LBS | BODY MASS INDEX: 17.71 KG/M2 | HEART RATE: 82 BPM | SYSTOLIC BLOOD PRESSURE: 162 MMHG

## 2025-05-14 DIAGNOSIS — I44.1 ATRIOVENTRICULAR BLOCK, SECOND DEGREE: ICD-10-CM

## 2025-05-14 DIAGNOSIS — I44.2 INTERMITTENT COMPLETE HEART BLOCK: Primary | ICD-10-CM

## 2025-05-14 PROCEDURE — 3079F DIAST BP 80-89 MM HG: CPT | Performed by: STUDENT IN AN ORGANIZED HEALTH CARE EDUCATION/TRAINING PROGRAM

## 2025-05-14 PROCEDURE — 3077F SYST BP >= 140 MM HG: CPT | Performed by: STUDENT IN AN ORGANIZED HEALTH CARE EDUCATION/TRAINING PROGRAM

## 2025-05-14 PROCEDURE — 99214 OFFICE O/P EST MOD 30 MIN: CPT | Mod: 25 | Performed by: STUDENT IN AN ORGANIZED HEALTH CARE EDUCATION/TRAINING PROGRAM

## 2025-05-14 PROCEDURE — 1111F DSCHRG MED/CURRENT MED MERGE: CPT | Performed by: STUDENT IN AN ORGANIZED HEALTH CARE EDUCATION/TRAINING PROGRAM

## 2025-05-14 PROCEDURE — 93005 ELECTROCARDIOGRAM TRACING: CPT | Performed by: STUDENT IN AN ORGANIZED HEALTH CARE EDUCATION/TRAINING PROGRAM

## 2025-05-14 PROCEDURE — 99214 OFFICE O/P EST MOD 30 MIN: CPT | Performed by: STUDENT IN AN ORGANIZED HEALTH CARE EDUCATION/TRAINING PROGRAM

## 2025-05-14 PROCEDURE — 1159F MED LIST DOCD IN RCRD: CPT | Performed by: STUDENT IN AN ORGANIZED HEALTH CARE EDUCATION/TRAINING PROGRAM

## 2025-05-14 PROCEDURE — 1036F TOBACCO NON-USER: CPT | Performed by: STUDENT IN AN ORGANIZED HEALTH CARE EDUCATION/TRAINING PROGRAM

## 2025-05-14 PROCEDURE — 1126F AMNT PAIN NOTED NONE PRSNT: CPT | Performed by: STUDENT IN AN ORGANIZED HEALTH CARE EDUCATION/TRAINING PROGRAM

## 2025-05-14 ASSESSMENT — ENCOUNTER SYMPTOMS
DEPRESSION: 0
LOSS OF SENSATION IN FEET: 0
OCCASIONAL FEELINGS OF UNSTEADINESS: 0

## 2025-05-14 ASSESSMENT — PAIN SCALES - GENERAL: PAINLEVEL_OUTOF10: 0-NO PAIN

## 2025-05-14 ASSESSMENT — PATIENT HEALTH QUESTIONNAIRE - PHQ9
SUM OF ALL RESPONSES TO PHQ9 QUESTIONS 1 AND 2: 0
1. LITTLE INTEREST OR PLEASURE IN DOING THINGS: NOT AT ALL
2. FEELING DOWN, DEPRESSED OR HOPELESS: NOT AT ALL

## 2025-05-14 NOTE — PROGRESS NOTES
[facility] Heart and Vascular Electrophysiology    Patient Name: Marita Carvalho  Patient : 1940    Referred for  Mobitz 2 second-degree AV Block    History of Present Illness:  Marita Carvalho is a 84 y.o. 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  HTN  B-cell lymphoma   Medication noncompliance    Patient was admitted back in April and found to have second-degree AV block with episodes of 2-1 AV block.  According to the notes, it was not clear if the patient was in Mobitz 1 or 2.  She denies any lightheadedness, near-syncope, syncope, shortness of breath or fatigue.  However, when looking at her past EKGs, there is an EKG from May 23 2024 showing complete heart block.  Her EKG today shows sinus rhythm with incomplete right bundle branch block QRS of 110 ms and a heart rate of 82 bpm.    Past Medical History:  She has a past medical history of Diffuse large B cell lymphoma and Hypertension.    Past Surgical History:  She has a past surgical history that includes CT guided imaging for needle placement (2017).      Social History:  She reports that she has never smoked. She has never used smokeless tobacco. No history on file for alcohol use and drug use.    Family History:  Family History[1]     Allergies:  Patient has no known allergies.    Outpatient Medications:  Current Outpatient Medications   Medication Instructions    amLODIPine (NORVASC) 5 mg, oral, Daily, Hold for systolic blood pressure less than 100 mmhg    losartan (COZAAR) 25 mg, oral, Daily        ROS:  A 14 point review of systems was done and is negative other than as stated in HPI    Physical Exam  Constitutional:       Appearance: Normal appearance.   Cardiovascular:      Rate and Rhythm: Normal rate and regular rhythm.      Heart sounds: No murmur heard.     No friction rub. No gallop.   Pulmonary:      Effort: Pulmonary effort is normal.      Breath sounds: Normal breath sounds.   Abdominal:      Palpations: Abdomen  is soft.   Musculoskeletal:      Cervical back: Neck supple.   Neurological:      Mental Status: She is alert.   Psychiatric:         Mood and Affect: Mood normal.         Behavior: Behavior normal.         Vitals:  There were no vitals taken for this visit.       Labs:   CBC  Lab Results   Component Value Date    WBC 6.2 04/17/2025    HGB 13.2 04/17/2025    HCT 40.0 04/17/2025    MCV 88 04/17/2025     04/17/2025        Renal Function Panel  Lab Results   Component Value Date    GLUCOSE 90 04/17/2025     04/17/2025    K 3.8 04/17/2025     04/17/2025    CO2 27 04/17/2025    ANIONGAP 13 04/17/2025    BUN 31 (H) 04/17/2025    CREATININE 0.98 04/17/2025    CALCIUM 9.5 04/17/2025        CMP  Lab Results   Component Value Date    CALCIUM 9.5 04/17/2025    PHOS 3.1 04/17/2025    PROT 5.8 (L) 04/15/2025    ALBUMIN 3.5 04/17/2025    AST 27 04/15/2025    ALT 27 04/15/2025    ALKPHOS 99 04/15/2025    BILITOT 0.8 04/15/2025       TSH  Lab Results   Component Value Date    TSH 3.33 07/22/2020    FREET4 1.3 08/07/2019          Cardiac Testing:  ECG  05/14/2025  sinus rhythm with incomplete right bundle branch block QRS of 110 ms and a heart rate of 82 bpm.    Echocardiogram  04/16/2025  PHYSICIAN INTERPRETATION:  Left Ventricle: The left ventricular systolic function is normal, with a visually estimated ejection fraction of 65-70%. There is concentric left ventricular hypertrophy. There are no regional wall motion abnormalities. The left ventricular cavity size is normal. There is mild increased septal thickness. There is left ventricular concentric remodeling. Spectral Doppler shows a Grade I (impaired relaxation pattern) of left ventricular diastolic filling with normal left atrial filling pressure.  Left Atrium: The left atrial size is normal.  Right Ventricle: The right ventricle is normal in size. There is normal right ventricular global systolic function.  Right Atrium: The right atrial size is  normal.  Aortic Valve: The aortic valve is trileaflet. There is mild aortic valve regurgitation. The peak instantaneous gradient of the aortic valve is 13 mmHg.  Mitral Valve: The mitral valve is normal in structure. There is trace mitral valve regurgitation.  Tricuspid Valve: The tricuspid valve is structurally normal. No evidence of tricuspid regurgitation.  Pulmonic Valve: The pulmonic valve is not well visualized. The pulmonic valve regurgitation was not well visualized.  Pericardium: Trivial pericardial effusion.  Aorta: The aortic root is normal.        CONCLUSIONS:   1. The left ventricular systolic function is normal, with a visually estimated ejection fraction of 65-70%.   2. Spectral Doppler shows a Grade I (impaired relaxation pattern) of left ventricular diastolic filling with normal left atrial filling pressure.   3. There is normal right ventricular global systolic function.   4. Mild aortic valve regurgitation.      Assessment:     Patient was admitted back in April and found to have second-degree AV block with episodes of 2-1 AV block.  According to the notes, it was not clear if the patient was in Mobitz 1 or 2.  She denies any lightheadedness, near-syncope, syncope, shortness of breath or fatigue.  However, when looking at her past EKGs, there is an EKG from May 23 2024 showing complete heart block.  Her EKG today shows sinus rhythm with incomplete right bundle branch block QRS of 110 ms and a heart rate of 82 bpm.    Plan:  I had a long conversation with the patient about the findings. I explained to him about the risks and benefits of a permanent pacemaker implantation and described the procedure in detail. The patient would like to proceed. Will schedule.  Patient is from [place] and was concerned about traveling [place] with a pacemaker.  I reassured her and explained to her about traveling overseas with a pacemaker.       [1] No family history on file.

## 2025-05-15 LAB
ATRIAL RATE: 82 BPM
P AXIS: 77 DEGREES
P OFFSET: 199 MS
P ONSET: 148 MS
PR INTERVAL: 148 MS
Q ONSET: 222 MS
QRS COUNT: 14 BEATS
QRS DURATION: 110 MS
QT INTERVAL: 404 MS
QTC CALCULATION(BAZETT): 472 MS
QTC FREDERICIA: 448 MS
R AXIS: 94 DEGREES
T AXIS: 71 DEGREES
T OFFSET: 424 MS
VENTRICULAR RATE: 82 BPM

## 2025-05-16 DIAGNOSIS — I44.2 COMPLETE HEART BLOCK: ICD-10-CM

## 2025-06-24 NOTE — H&P
History Of Present Illness  Marita Carvalho is a 84 y.o. female presenting with history complete heart block. Here for dual chamber PPM implant. Pt reports in April she was walking and passed out, admitted to hospital and found to have second-degree AV block with episodes of 2-1 AV block. Unclear if Mobitz 1 or 2. EKG 5/2024 showing complete heart block. 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    HENT: Negative.     Eyes: Negative.    Respiratory: Negative.     Cardiovascular: Negative.    Gastrointestinal: Negative.    Endocrine: Negative.    Genitourinary: Negative.    Musculoskeletal: Negative.    Skin: Negative.    Allergic/Immunologic: Negative.    Neurological: Negative.    Hematological: Negative.    Psychiatric/Behavioral: Negative.            Physical Exam  Constitutional:       General: She is awake. She is not in acute distress.     Appearance: Normal appearance. She is not ill-appearing.   HENT:      Head: Normocephalic and atraumatic.      Mouth/Throat:      Mouth: Mucous membranes are moist.      Pharynx: Oropharynx is clear.   Cardiovascular:      Rate and Rhythm: Normal rate and regular rhythm.      Pulses:           Radial pulses are 2+ on the right side and 2+ on the left side.        Dorsalis pedis pulses are 2+ on the right side and 2+ on the left side.      Heart sounds: Normal heart sounds. No murmur  no edema,  no murmurs,  regular rate and rhythm , no edema. , S1, S2 normal "heard.  Pulmonary:      Effort: Pulmonary effort is normal.      Breath sounds: Normal breath sounds.   Abdominal:      General: Bowel sounds are normal.      Palpations: Abdomen is soft.   Musculoskeletal:         General: Normal range of motion.      Cervical back: Normal range of motion and neck supple.      Right lower leg: No edema.      Left lower leg: No edema.   Skin:     General: Skin is warm and dry.      Capillary Refill: Capillary refill takes less than 2 seconds.   Neurological:      General: No focal deficit present.      Mental Status: She is alert and oriented to person, place, and time. Mental status is at baseline.      Cranial Nerves: Cranial nerves 2-12 are intact.   Psychiatric:         Mood and Affect: Mood and affect normal.         Behavior: Behavior normal.        Sedation Plan    ASA 4     Mallampati class: III.           NPO since 1900 on 6/30/2025    Last Recorded Vitals  Blood pressure (!) 188/88, pulse 83, temperature 36.5 °C (97.7 °F), temperature source Temporal, resp. rate 16, SpO2 100%.         Vitals from the Past 24 Hours  Heart Rate:  [83]   Temp:  [36.5 °C (97.7 °F)]   Resp:  [16]   BP: (188)/(88)   SpO2:  [100 %]          Relevant Results    Labs    POCT Glucose:      POCT Urine Pregnancy:       CBC:   Recent Labs     04/17/25  0444 04/16/25  0417 04/15/25  1908   WBC 6.2 7.2 6.0   HGB 13.2 13.6 11.2*   HCT 40.0 40.3 34.5*    288 225   MCV 88 87 91     BMP/CMP:   Recent Labs     04/17/25  0444 04/16/25  0417 04/15/25  1908    133* 137   K 3.8 3.5 3.4*    100 105   BUN 31* 22 22   CREATININE 0.98 0.70 0.65   CO2 27 26 26   CALCIUM 9.5 9.2 8.3*   PROT  --   --  5.8*   BILITOT  --   --  0.8   ALKPHOS  --   --  99   ALT  --   --  27   AST  --   --  27   GLUCOSE 90 108* 119*      Magnesium:   No results for input(s): \"MG\" in the last 8760 hours.    Lipid Panel:   No results for input(s): \"CHOL\", \"HDL\", \"CHHDL\", \"LDL\", \"VLDL\", \"TRIG\", \"NHDL\" in the last 8760 " "hours.    Cardiac       No lab exists for component: \"CK\", \"CKMBP\"   Hemoglobin A1C:   No results for input(s): \"HGBA1C\" in the last 8760 hours.    TSH/ Free T4:   No results for input(s): \"TSH\", \"FREET4\" in the last 8760 hours.    Iron: No results for input(s): \"FERRITIN\", \"TIBC\", \"IRONSAT\", \"BNP\" in the last 8760 hours.  Coag:     ABO: No results found for: \"ABO\"    Past Cardiology Tests (Last 3 Years):    EKG:  Recent Labs     05/14/25  1000 04/15/25  1915 04/15/25  1915   ATRRATE 82 55 82   VENTRATE 82 55 42   PRINT 148 144 146   QRSDUR 110 110 104   QTCFRED 448 584 609   QTCCALCB 472 575 572     Encounter Date: 05/14/25   ECG 12 lead (Clinic Performed)   Result Value    Ventricular Rate 82    Atrial Rate 82    NH Interval 148    QRS Duration 110    QT Interval 404    QTC Calculation(Bazett) 472    P Axis 77    R Axis 94    T Axis 71    QRS Count 14    Q Onset 222    P Onset 148    P Offset 199    T Offset 424    QTC Fredericia 448    Narrative    Normal sinus rhythm  Biatrial enlargement  Rightward axis  Incomplete right bundle branch block  Abnormal ECG  When compared with ECG of 15-APR-2025 23:45,  Significant changes have occurred  Confirmed by Moody Prado ([id number]) on 5/15/2025 8:48:17 AM     Echo:  Echocardiogram:   Transthoracic Echo (TTE) Complete 04/16/2025    [facility]  [address] [place], [place] [address]  Phone [phone]    TRANSTHORACIC ECHOCARDIOGRAM REPORT    Patient Name:       HARRIS Chu Physician:    [id number] Portillo DO  Study Date:         4/16/2025            Ordering Provider:    Margarita PORTILLO  MRN/PID:            [id number]             Fellow:  Accession#:         [id number]         Nurse:  Date of Birth/Age:  1940 / 84 years Sonographer:          Robyn Wood RDCS  Gender Assigned at  F                    Additional Staff:  Birth:  Height:             160.02 cm            Admit Date:  Weight:             40.37 kg         "     Admission Status:     Inpatient -  Routine  BSA / BMI:          1.37 m2 / 15.77      Department [facility]  kg/m2  Blood Pressure: 151 /49 mmHg    Study Type:    TRANSTHORACIC ECHO (TTE) COMPLETE  Diagnosis/ICD: Bradycardia, unspecified-R00.1  Indication:    Bradycardia  CPT Codes:     Echo Complete w Full Doppler-[id number]    Patient History:  Pertinent History: HTN. Bradycardia, Elevated troponin.    Study Detail: The following Echo studies were performed: 2D, M-Mode, Doppler and  color flow.      PHYSICIAN INTERPRETATION:  Left Ventricle: The left ventricular systolic function is normal, with a visually estimated ejection fraction of 65-70%. There is concentric left ventricular hypertrophy. There are no regional wall motion abnormalities. The left ventricular cavity size is normal. There is mild increased septal thickness. There is left ventricular concentric remodeling. Spectral Doppler shows a Grade I (impaired relaxation pattern) of left ventricular diastolic filling with normal left atrial filling pressure.  Left Atrium: The left atrial size is normal.  Right Ventricle: The right ventricle is normal in size. There is normal right ventricular global systolic function.  Right Atrium: The right atrial size is normal.  Aortic Valve: The aortic valve is trileaflet. There is mild aortic valve regurgitation. The peak instantaneous gradient of the aortic valve is 13 mmHg.  Mitral Valve: The mitral valve is normal in structure. There is trace mitral valve regurgitation.  Tricuspid Valve: The tricuspid valve is structurally normal. No evidence of tricuspid regurgitation.  Pulmonic Valve: The pulmonic valve is not well visualized. The pulmonic valve regurgitation was not well visualized.  Pericardium: Trivial pericardial effusion.  Aorta: The aortic root is normal.      CONCLUSIONS:  1. The left ventricular systolic function is normal, with a visually estimated ejection fraction of 65-70%.  2. Spectral  Doppler shows a Grade I (impaired relaxation pattern) of left ventricular diastolic filling with normal left atrial filling pressure.  3. There is normal right ventricular global systolic function.  4. Mild aortic valve regurgitation.    QUANTITATIVE DATA SUMMARY:    2D MEASUREMENTS:             Normal Ranges:  LAs:             2.10 cm     (2.7-4.0cm)  IVSd:            0.97 cm     (0.6-1.1cm)  LVPWd:           0.94 cm     (0.6-1.1cm)  LVIDd:           3.65 cm     (3.9-5.9cm)  LVIDs:           2.11 cm  LV Mass Index:   75.4 g/m2  LVEDV Index:     63.06 ml/m2  LV % FS          42.2 %      LEFT ATRIUM:                 Normal Ranges:  LA Vol A4C:        19.6 ml   (22+/-6mL/m2)  LA Vol Index A4C:  14.3ml/m2  LA Area A4C:       10.6 cm2  LA Major Axis A4C: 4.9 cm  LA Vol A4C:        18.0 ml      RIGHT ATRIUM:                 Normal Ranges:  RA Vol A4C:        20.7 ml    (8.3-19.5ml)  RA Vol Index A4C:  15.1 ml/m2  RA Area A4C:       9.6 cm2  RA Major Axis A4C: 3.8 cm      M-MODE MEASUREMENTS:         Normal Ranges:  Ao Root:             2.40 cm (2.0-3.7cm)  LAs:                 2.20 cm (2.7-4.0cm)      LV SYSTOLIC FUNCTION:  Normal Ranges:  EF-A4C View:    64 % (>=55%)  EF-A2C View:    67 %  EF-Biplane:     65 %  EF-Visual:      68 %  LV EF Reported: 68 %      LV DIASTOLIC FUNCTION:            Normal Ranges:  MV Peak E:             0.74 m/s   (0.7-1.2 m/s)  MV Peak A:             1.14 m/s   (0.42-0.7 m/s)  E/A Ratio:             0.65       (1.0-2.2)  MV medial e'           0.08 m/s  PulmV Sys John:         65.50 cm/s  PulmV Goff John:        29.40 cm/s  PulmV S/D John:         2.20      MITRAL VALVE:          Normal Ranges:  MV DT:        321 msec (150-240msec)      AORTIC VALVE:            Normal Ranges:  AoV Vmax:      1.79 m/s  (<=1.7m/s)  AoV Peak P.8 mmHg (<20mmHg)  LVOT Max John:  1.92 m/s  (<=1.1m/s)  LVOT Diameter: 1.70 cm   (1.8-2.4cm)  AoV Area,Vmax: 2.43 cm2  (2.5-4.5cm2)      AORTIC INSUFFICIENCY:  AI Vmax:        4.37 m/s  AI Half-time:  770 msec  AI Decel Rate: 166.00 cm/s2      RIGHT VENTRICLE:  TAPSE: 22.7 mm  RV s'  0.11 m/s      TRICUSPID VALVE/RVSP:          Normal Ranges:  Peak TR Velocity:     2.49 m/s  RV Syst Pressure:     28 mmHg  (< 30mmHg)  IVC Diam:             2.09 cm      PULMONIC VALVE:          Normal Ranges:  PV Accel Time:  124 msec (>120ms)  PV Max John:     1.3 m/s  (0.6-0.9m/s)  PV Max P.6 mmHg      PULMONARY VEINS:  PulmV Goff John: 29.40 cm/s  PulmV S/D John:  2.20  PulmV Sys John:  65.50 cm/s      [id number] Jaskaran Sloan DO  Electronically signed on 2025 at 1:22:39 PM        ** Final **    Ejection Fractions:  LV EF   Date/Time Value Ref Range Status   2025 11:23 AM 68 %      Cath:  Coronary Angiography: No results found for this or any previous visit from the past 1800 days.    Right Heart Cath: No results found for this or any previous visit from the past 1800 days.    Stress Test:  Nuclear:No results found for this or any previous visit from the past 1800 days.    Metabolic Stress: No results found for this or any previous visit from the past 1800 days.    Cardiac Imaging:  Cardiac Scoring: No results found for this or any previous visit from the past 1800 days.    Cardiac MRI: No results found for this or any previous visit from the past 1800 days.         Assessment/Plan  Assessment/Plan   Assessment & Plan  Complete heart block    Complete heart block  Second degree heart block    Here for dual chamber PPM implant with Dr. Marin on2025.  Prophylactic antibiotic Ancef 1gm IV pre-procedure.         NP discussed with Dr. Marin regarding plan of care/ discharge plan      I spent 35 minutes in the professional and overall care of this patient.      Kaitlynn Santizo, APRN-CNP         [1]   Past Medical History:  Diagnosis Date    Diffuse large B cell lymphoma     Hypertension    [2]   Past Surgical History:  Procedure Laterality Date    CT GUIDED IMAGING FOR NEEDLE  PLACEMENT  7/17/2017    CT GUIDED IMAGING FOR NEEDLE PLACEMENT [facility]   [3]   Social History  Tobacco Use    Smoking status: Never    Smokeless tobacco: Never   Substance Use Topics    Alcohol use: Never    Drug use: Never   [4] No family history on file.  [5] No Known Allergies  [6]   Scheduled medications   Medication Dose Route Frequency    ceFAZolin  1 g intravenous Once   [7]   PRN medications   Medication   [8]   Continuous Medications   Medication Dose Last Rate

## 2025-07-01 ENCOUNTER — APPOINTMENT (OUTPATIENT)
Dept: RADIOLOGY | Facility: HOSPITAL | Age: 85
End: 2025-07-01
Payer: COMMERCIAL

## 2025-07-01 ENCOUNTER — PHARMACY VISIT (OUTPATIENT)
Dept: PHARMACY | Facility: CLINIC | Age: 85
End: 2025-07-01
Payer: MEDICARE

## 2025-07-01 ENCOUNTER — HOSPITAL ENCOUNTER (OUTPATIENT)
Facility: HOSPITAL | Age: 85
Setting detail: OUTPATIENT SURGERY
Discharge: HOME | End: 2025-07-01
Attending: STUDENT IN AN ORGANIZED HEALTH CARE EDUCATION/TRAINING PROGRAM | Admitting: STUDENT IN AN ORGANIZED HEALTH CARE EDUCATION/TRAINING PROGRAM
Payer: COMMERCIAL

## 2025-07-01 VITALS
HEIGHT: 63 IN | RESPIRATION RATE: 15 BRPM | HEART RATE: 77 BPM | BODY MASS INDEX: 17.72 KG/M2 | DIASTOLIC BLOOD PRESSURE: 96 MMHG | OXYGEN SATURATION: 100 % | WEIGHT: 100 LBS | SYSTOLIC BLOOD PRESSURE: 212 MMHG | TEMPERATURE: 97.7 F

## 2025-07-01 DIAGNOSIS — Z95.0 CARDIAC PACEMAKER IN SITU: ICD-10-CM

## 2025-07-01 DIAGNOSIS — I44.2 INTERMITTENT COMPLETE HEART BLOCK: ICD-10-CM

## 2025-07-01 DIAGNOSIS — I44.2 COMPLETE HEART BLOCK: Primary | ICD-10-CM

## 2025-07-01 DIAGNOSIS — I44.1 ATRIOVENTRICULAR BLOCK, SECOND DEGREE: ICD-10-CM

## 2025-07-01 DIAGNOSIS — I10 ESSENTIAL HYPERTENSION: ICD-10-CM

## 2025-07-01 DIAGNOSIS — Z95.0 POSTSURGICAL CARDIAC PACEMAKER IN SITU: ICD-10-CM

## 2025-07-01 LAB
ANION GAP SERPL CALCULATED.3IONS-SCNC: 11 MMOL/L (ref 10–20)
BUN SERPL-MCNC: 21 MG/DL (ref 6–23)
CALCIUM SERPL-MCNC: 10.3 MG/DL (ref 8.6–10.3)
CHLORIDE SERPL-SCNC: 103 MMOL/L (ref 98–107)
CO2 SERPL-SCNC: 30 MMOL/L (ref 21–32)
CREAT SERPL-MCNC: 0.99 MG/DL (ref 0.5–1.05)
EGFRCR SERPLBLD CKD-EPI 2021: 56 ML/MIN/1.73M*2
ERYTHROCYTE [DISTWIDTH] IN BLOOD BY AUTOMATED COUNT: 13.7 % (ref 11.5–14.5)
GLUCOSE SERPL-MCNC: 95 MG/DL (ref 74–99)
HCT VFR BLD AUTO: 42.2 % (ref 36–46)
HGB BLD-MCNC: 14.3 G/DL (ref 12–16)
MCH RBC QN AUTO: 29.6 PG (ref 26–34)
MCHC RBC AUTO-ENTMCNC: 33.9 G/DL (ref 32–36)
MCV RBC AUTO: 87 FL (ref 80–100)
NRBC BLD-RTO: 0 /100 WBCS (ref 0–0)
PLATELET # BLD AUTO: 266 X10*3/UL (ref 150–450)
POTASSIUM SERPL-SCNC: 3.7 MMOL/L (ref 3.5–5.3)
RBC # BLD AUTO: 4.83 X10*6/UL (ref 4–5.2)
SODIUM SERPL-SCNC: 140 MMOL/L (ref 136–145)
WBC # BLD AUTO: 5.4 X10*3/UL (ref 4.4–11.3)

## 2025-07-01 PROCEDURE — 2750000001 HC OR 275 NO HCPCS: Performed by: STUDENT IN AN ORGANIZED HEALTH CARE EDUCATION/TRAINING PROGRAM

## 2025-07-01 PROCEDURE — 71045 X-RAY EXAM CHEST 1 VIEW: CPT | Performed by: RADIOLOGY

## 2025-07-01 PROCEDURE — 33208 INSRT HEART PM ATRIAL & VENT: CPT | Performed by: STUDENT IN AN ORGANIZED HEALTH CARE EDUCATION/TRAINING PROGRAM

## 2025-07-01 PROCEDURE — 80048 BASIC METABOLIC PNL TOTAL CA: CPT | Performed by: NURSE PRACTITIONER

## 2025-07-01 PROCEDURE — 2720000007 HC OR 272 NO HCPCS: Performed by: STUDENT IN AN ORGANIZED HEALTH CARE EDUCATION/TRAINING PROGRAM

## 2025-07-01 PROCEDURE — 36415 COLL VENOUS BLD VENIPUNCTURE: CPT | Performed by: NURSE PRACTITIONER

## 2025-07-01 PROCEDURE — 99223 1ST HOSP IP/OBS HIGH 75: CPT | Performed by: NURSE PRACTITIONER

## 2025-07-01 PROCEDURE — C1889 IMPLANT/INSERT DEVICE, NOC: HCPCS | Performed by: STUDENT IN AN ORGANIZED HEALTH CARE EDUCATION/TRAINING PROGRAM

## 2025-07-01 PROCEDURE — 7100000009 HC PHASE TWO TIME - INITIAL BASE CHARGE: Performed by: STUDENT IN AN ORGANIZED HEALTH CARE EDUCATION/TRAINING PROGRAM

## 2025-07-01 PROCEDURE — 99152 MOD SED SAME PHYS/QHP 5/>YRS: CPT | Performed by: STUDENT IN AN ORGANIZED HEALTH CARE EDUCATION/TRAINING PROGRAM

## 2025-07-01 PROCEDURE — C1898 LEAD, PMKR, OTHER THAN TRANS: HCPCS | Performed by: STUDENT IN AN ORGANIZED HEALTH CARE EDUCATION/TRAINING PROGRAM

## 2025-07-01 PROCEDURE — 71045 X-RAY EXAM CHEST 1 VIEW: CPT

## 2025-07-01 PROCEDURE — 2500000001 HC RX 250 WO HCPCS SELF ADMINISTERED DRUGS (ALT 637 FOR MEDICARE OP): Performed by: NURSE PRACTITIONER

## 2025-07-01 PROCEDURE — 85027 COMPLETE CBC AUTOMATED: CPT | Performed by: NURSE PRACTITIONER

## 2025-07-01 PROCEDURE — C1892 INTRO/SHEATH,FIXED,PEEL-AWAY: HCPCS | Performed by: STUDENT IN AN ORGANIZED HEALTH CARE EDUCATION/TRAINING PROGRAM

## 2025-07-01 PROCEDURE — 7100000010 HC PHASE TWO TIME - EACH INCREMENTAL 1 MINUTE: Performed by: STUDENT IN AN ORGANIZED HEALTH CARE EDUCATION/TRAINING PROGRAM

## 2025-07-01 PROCEDURE — C1894 INTRO/SHEATH, NON-LASER: HCPCS | Performed by: STUDENT IN AN ORGANIZED HEALTH CARE EDUCATION/TRAINING PROGRAM

## 2025-07-01 PROCEDURE — 99153 MOD SED SAME PHYS/QHP EA: CPT | Performed by: STUDENT IN AN ORGANIZED HEALTH CARE EDUCATION/TRAINING PROGRAM

## 2025-07-01 PROCEDURE — RXMED WILLOW AMBULATORY MEDICATION CHARGE

## 2025-07-01 PROCEDURE — C1887 CATHETER, GUIDING: HCPCS | Performed by: STUDENT IN AN ORGANIZED HEALTH CARE EDUCATION/TRAINING PROGRAM

## 2025-07-01 PROCEDURE — 2500000004 HC RX 250 GENERAL PHARMACY W/ HCPCS (ALT 636 FOR OP/ED): Performed by: STUDENT IN AN ORGANIZED HEALTH CARE EDUCATION/TRAINING PROGRAM

## 2025-07-01 PROCEDURE — 2780000003 HC OR 278 NO HCPCS: Performed by: STUDENT IN AN ORGANIZED HEALTH CARE EDUCATION/TRAINING PROGRAM

## 2025-07-01 PROCEDURE — 2500000004 HC RX 250 GENERAL PHARMACY W/ HCPCS (ALT 636 FOR OP/ED): Performed by: NURSE PRACTITIONER

## 2025-07-01 PROCEDURE — 2550000001 HC RX 255 CONTRASTS: Performed by: STUDENT IN AN ORGANIZED HEALTH CARE EDUCATION/TRAINING PROGRAM

## 2025-07-01 PROCEDURE — 75820 VEIN X-RAY ARM/LEG: CPT | Performed by: STUDENT IN AN ORGANIZED HEALTH CARE EDUCATION/TRAINING PROGRAM

## 2025-07-01 PROCEDURE — C1785 PMKR, DUAL, RATE-RESP: HCPCS | Performed by: STUDENT IN AN ORGANIZED HEALTH CARE EDUCATION/TRAINING PROGRAM

## 2025-07-01 DEVICE — LEAD 383069 SELECT SECURE US EN FPA
Type: IMPLANTABLE DEVICE | Site: HEART | Status: FUNCTIONAL
Brand: SELECTSECURE™

## 2025-07-01 DEVICE — LEAD 5076-52 CAPSUREFIX NOVUS US EN
Type: IMPLANTABLE DEVICE | Site: HEART | Status: FUNCTIONAL
Brand: CAPSUREFIX® NOVUS

## 2025-07-01 DEVICE — IPG W1DR01 AZURE XT DR MRI WL USA BCP
Type: IMPLANTABLE DEVICE | Site: CHEST  WALL | Status: FUNCTIONAL
Brand: AZURE™ XT DR MRI SURESCAN™

## 2025-07-01 RX ORDER — ONDANSETRON HYDROCHLORIDE 2 MG/ML
4 INJECTION, SOLUTION INTRAVENOUS EVERY 8 HOURS PRN
Status: DISCONTINUED | OUTPATIENT
Start: 2025-07-01 | End: 2025-07-01 | Stop reason: HOSPADM

## 2025-07-01 RX ORDER — ACETAMINOPHEN 325 MG/1
650 TABLET ORAL EVERY 4 HOURS PRN
Status: DISCONTINUED | OUTPATIENT
Start: 2025-07-01 | End: 2025-07-01 | Stop reason: HOSPADM

## 2025-07-01 RX ORDER — MIDAZOLAM HYDROCHLORIDE 1 MG/ML
INJECTION, SOLUTION INTRAMUSCULAR; INTRAVENOUS AS NEEDED
Status: DISCONTINUED | OUTPATIENT
Start: 2025-07-01 | End: 2025-07-01 | Stop reason: HOSPADM

## 2025-07-01 RX ORDER — FENTANYL CITRATE 50 UG/ML
INJECTION, SOLUTION INTRAMUSCULAR; INTRAVENOUS AS NEEDED
Status: DISCONTINUED | OUTPATIENT
Start: 2025-07-01 | End: 2025-07-01 | Stop reason: HOSPADM

## 2025-07-01 RX ORDER — BUPIVACAINE HYDROCHLORIDE 2.5 MG/ML
INJECTION, SOLUTION EPIDURAL; INFILTRATION; INTRACAUDAL; PERINEURAL AS NEEDED
Status: DISCONTINUED | OUTPATIENT
Start: 2025-07-01 | End: 2025-07-01 | Stop reason: HOSPADM

## 2025-07-01 RX ORDER — OXYCODONE AND ACETAMINOPHEN 5; 325 MG/1; MG/1
1 TABLET ORAL EVERY 8 HOURS PRN
Qty: 5 TABLET | Refills: 0 | Status: SHIPPED | OUTPATIENT
Start: 2025-07-01 | End: 2025-07-04

## 2025-07-01 RX ORDER — AMLODIPINE BESYLATE 5 MG/1
5 TABLET ORAL DAILY
Qty: 90 TABLET | Refills: 3 | Status: SHIPPED | OUTPATIENT
Start: 2025-07-01 | End: 2026-07-01

## 2025-07-01 RX ORDER — IODIXANOL 320 MG/ML
INJECTION, SOLUTION INTRAVASCULAR AS NEEDED
Status: DISCONTINUED | OUTPATIENT
Start: 2025-07-01 | End: 2025-07-01 | Stop reason: HOSPADM

## 2025-07-01 RX ORDER — ONDANSETRON 4 MG/1
4 TABLET, FILM COATED ORAL EVERY 8 HOURS PRN
Status: DISCONTINUED | OUTPATIENT
Start: 2025-07-01 | End: 2025-07-01 | Stop reason: HOSPADM

## 2025-07-01 RX ORDER — HYDRALAZINE HYDROCHLORIDE 20 MG/ML
INJECTION INTRAMUSCULAR; INTRAVENOUS AS NEEDED
Status: DISCONTINUED | OUTPATIENT
Start: 2025-07-01 | End: 2025-07-01 | Stop reason: HOSPADM

## 2025-07-01 RX ORDER — LOSARTAN POTASSIUM 25 MG/1
25 TABLET ORAL DAILY
Qty: 90 TABLET | Refills: 3 | Status: SHIPPED | OUTPATIENT
Start: 2025-07-01 | End: 2026-07-01

## 2025-07-01 RX ORDER — ACETAMINOPHEN 325 MG/1
650 TABLET ORAL ONCE
Status: COMPLETED | OUTPATIENT
Start: 2025-07-01 | End: 2025-07-01

## 2025-07-01 RX ORDER — CEFAZOLIN SODIUM 1 G/50ML
1 SOLUTION INTRAVENOUS ONCE
Status: COMPLETED | OUTPATIENT
Start: 2025-07-01 | End: 2025-07-01

## 2025-07-01 RX ADMIN — CEFAZOLIN SODIUM 1 G: 1 INJECTION, SOLUTION INTRAVENOUS at 08:28

## 2025-07-01 RX ADMIN — ACETAMINOPHEN 650 MG: 325 TABLET ORAL at 11:11

## 2025-07-01 ASSESSMENT — PAIN SCALES - GENERAL
PAINLEVEL_OUTOF10: 4
PAINLEVEL_OUTOF10: 7
PAINLEVEL_OUTOF10: 0 - NO PAIN

## 2025-07-01 ASSESSMENT — ENCOUNTER SYMPTOMS
ALLERGIC/IMMUNOLOGIC NEGATIVE: 1
CARDIOVASCULAR NEGATIVE: 1
NEUROLOGICAL NEGATIVE: 1
HEMATOLOGIC/LYMPHATIC NEGATIVE: 1
EYES NEGATIVE: 1
GASTROINTESTINAL NEGATIVE: 1
MUSCULOSKELETAL NEGATIVE: 1
CONSTITUTIONAL NEGATIVE: 1
PSYCHIATRIC NEGATIVE: 1
RESPIRATORY NEGATIVE: 1
ENDOCRINE NEGATIVE: 1

## 2025-07-01 ASSESSMENT — PAIN DESCRIPTION - DESCRIPTORS
DESCRIPTORS: SORE
DESCRIPTORS: SORE

## 2025-07-01 ASSESSMENT — PAIN - FUNCTIONAL ASSESSMENT
PAIN_FUNCTIONAL_ASSESSMENT: 0-10

## 2025-07-01 NOTE — Clinical Note
The PACEMAKER, DUAL CHAMBER, AYAH MRI XT DR - [id number] device was inserted. The leads were placed into the connector and visually verified to be in correct position. Injury current obtained.

## 2025-07-01 NOTE — DISCHARGE INSTRUCTIONS
Home-going Instructions After Device Implant    FOR NEXT 24 HOURS  - Upon discharge, you should return home and rest for the remainder of the day and evening. You do not have to stay on bed rest but should not be very active.  It is recommended a responsible adult be with you for the first 24 hours after the procedure.    -Please resume your blood thinning medication the evening of your procedure.      - Do not drive, operate machinery, or use power tools for 24 hours after your procedure.     - Do not make any legal decisions for 24 hours after your procedure.     - Do not drink alcoholic beverages for 24 hours after your procedure.    Incision:  If you have an occlusive dressing like Aquacel (tan-colored bandage) or Mepilex border, please leave on for one week.  Do not remove the steri strips, they will fall off on their own.     May shower tomorrow.   Do not submerge site in bath/swimming for 2 weeks, until incision is healed.       Call Dr. Marin at [phone] if you have any questions about your instructions, Monday-Friday between the hours of 7:00 am - 4:30 pm. After hours please call your physician's office.   After one week, you may wash the site with soap and water.   Inspect your incision each day.  If you note increased redness, swelling, or drainage, or if you develop a fever, please call your doctor IMMEDIATELY.     Your Doctor:  Dr. Marin         Telephone: at [phone].     Pain:  It is normal for the area around the incision to be tender for a few weeks following surgery.  Pain relievers such as Tylenol or Motrin are usually sufficient for pain relief.  The pain should get better each day, if it gets worse, please contact your doctor.    Activity Restrictions: new implant 4 weeks (date: 7/29/2025).  Avoid gross arm movement on the side of your new implant.  Do not raise or stretch your arm above shoulder level.  Do not pick-up weights greater than 15 lbs.  Avoid activities such as  golf or swimming for six weeks.      Driving: Please do not resume driving for 1 week(s) date: 7/8/2025.     ID Card:  It is important that you carry your device ID card with you at all times.  Inform all doctors and healthcare providers that you have a pacemaker or defibrillator.  Until the lead wires are completely healed in your heart, a prophylactic antibiotic may need to be given to you prior to procedures such as deep cleaning or extraction of teeth.    Electromagnetic Interference:  Microwave ovens are safe to use.  Cellular telephones should be held to the ear that is opposite your device.  If you are scheduled for a MRI, please notify the Device Clinic to check if you have a compatible device.  Present your device ID card to the [employer] .  The detector wand may be used below waist level and to inspect your shoes.  Read the patient booklet for more information.  You may call the device clinic or the patient services department of the device  with questions about specific electrical appliances and interference problems.          Can I Travel?  -Always carry your pacemaker identification card.  -Ask to be hand searched at security checkpoints, such as at the airport or your local courthouse.    Hand wands are not a substitute for a “pat down” search. Surveillance wands may be used  below your waist and on your shoes.  -If you are making an extended trip to another city, your pacemaker doctor may recommend a  physician or hospital that you can contact.    Can I Drive?  -Your doctor will specify any driving restrictions.    Other Points:  -Always notify healthcare providers, such as dentists and podiatrists, that you have an pacemaker. If  any surgery is planned for you, the anesthesia personnel must know in advance about your pacemaker.    Other Sources of Information:  Patient Services Department of the  of your pacemaker  The Internet holds an abundance of information.  Some internet sites of interest:  www.medtronic.com  www.[employer]  www.[employer].com  www.heartrhythm.com  [email or web address] TV    This info is a general resource. It is not meant to replace your health care provider’s advice.  Ask your doctor or health care team any questions. Always follow their instructions.

## 2025-07-01 NOTE — NURSING NOTE
END OF PROCEDURE MONITORING CRITERIA  01. BP is within +/- 20% of preprocedure  02. Oxygen sat is at 92% or above on RA, or existing order for O2 treatment, or at pre-sedation levels, otherwise new O2 order needed.  03. Unless the patient has a pre-procedure history of diminished level of consciousness, s/he is easily arousable and when aroused is able to responds appropriately for his/her age.  04. Significant complications related to the specific procedure are absent, have been controlled, or have been evaluated, including:      A. Pain.      B. Wound drainage.      C. All drains and tubes are patent.      D. Nausea and Vomiting. Vomiting is not persisten and has not occurred within 15 minutes prior to discharge.      E. Bladder distention. Voided bladder and/or no symptoms or urinary retention (e.g., bladder distention, frequent voiding in small amounts).      F. Neurovascular status.      G. Level of Consciousness consistent with pre procedural status.        friend(s)  affirmed that they were driving the patient home.   [employer] equipment given to patient.

## 2025-07-01 NOTE — POST-PROCEDURE NOTE
Physician Transition of Care Summary  Invasive Cardiovascular Lab    Procedure Date: 7/1/2025  Attending:    * Moody Carrillo - Primary  Resident/Fellow/Other Assistant: Surgeons and Role:  * No surgeons found with a matching role *    Indications:   Pre-op Diagnosis      * Complete heart block [I44.2]    Post-procedure diagnosis:   Post-op Diagnosis     * Complete heart block [I44.2]    Procedure(s):   PPM IMPLANT DUAL  03598 - MS INS NEW/RPLCMT PRM PM W/TRANSV ELTRD ATRIAL&VENT    MS INS NEW/RPLCMT PRM PM W/TRANSV ELTRD ATRIAL&VENT [66095]  MS MOD SED SAME PHYS/QHP INITIAL 15 MINS 5/> YRS [46329]  MS MOD SED SAME PHYS/QHP EACH ADDL 15 MINS [77900]    Procedure Findings:   See below    Description of the Procedure:     Sub-pectoral dual chamber pacemaker implantation with RV lead placement in left bundle area.    Procedures:  Implant of dual chamber PPM (73326)    Patient history:  See H&P     Procedure narrative:  The risks, benefits, and alternatives to the procedure and sedation were explained to the patient, and informed consent was obtained. The patient was in the fasting state. A grounding pad was placed. Self-adhesive anterior-posterior defibrillation pads were applied. A defibrillator was used for monitoring and the defibrillator waveform was set to biphasic. The patient was set up for continuous monitoring of surface 12 lead ECG and pulse oximetry. Blood pressure was monitored. The procedure was performed under IV conscious sedation supplemented with intermittent moderate sedation. The Left upper chest was prepped and draped in the usual sterile fashion. Local anesthesia: After preoperative IV antibiotic was completely infused, subcutaneous tissues just medial to the Left deltopectoral area, were infiltrated with  bupivacaine  for local anesthesia.      Using a  scalpel, an approximate 2 inch incision was made in the left delto-pectoral groove. Using a combination of electrocautery and sharp and blunt  "dissection a pocket was fashioned in the sub-pectoral subcutaneous tissue. The pocket was carefully inspected for hemostasis, which was achieved with limited electrocautery.      Via the incision, cephalic vein access could not be obtained. Venogram was performed and left axillary venous access was obtained.    Under fluoroscopic guidance, a 7Fr ''peel-away'' vascular sheath was positioned over the guidewire. A fixed curve 6Waves His sheath was then advanced over a Terumo glidewire into the right ventricle. Under fluoroscopic guidance, the sheath was advanced into the RV and slowly retracted and anteriorly deflected into the RV septum, near the area of the left bundle. Unipolar pace mapping was then performed via the 3830 lead, until a characteristic \"W\" sign was seen in V1 with an inferiorly directed axis. The lead was then actively fixed in this region via clockwise drilling of the lead into the septum.  Testing was performed and revealed a relatively narrow QRS complex with appropriate R wave sensing and impedance.  The sheath was then removed over a splitter.  The \"peel-away\" sheath was split and peeled away from the electrode. Adequate ''slack'' was placed as determined by fluoroscopy. The lead was anchored to the fascial pain with three 0-Ethibond sutures.     Under fluoroscopic guidance a 7Fr ''peel-away'' vascular sheath was positioned over the second guide wire. The dilator and wire were removed and the atrial pacing lead was passed through the sheath into the vasculature. Under fluoroscopic guidance the lead was positioned in the right atrial appendage. The straight stylet was replaced by a ''J'' curved stylet. The lead was positioned in the right atrial appendage. The active fixation mechanism was extended and lead parameters were tested including sensing, impedance and pacing threshold. A 10 volt test was performed and demonstrated no phrenic or diaphragmatic capture. The sheath was split and removed " from the electrode.  The stylet was withdrawn and adequate ''slack'' was placed as determined by fluoroscopy.  The lead was anchored to the fascial plane with three 0-Ethibond sutures. Lead parameters were again tested.     The lead pins were carefully placed in their respective ports in the device header. Care was taken to be sure the lead pins extended beyond the setscrews and that the setscrews were deployed. Gentle traction was placed on each pin to ensure a secure connection. The leads were then carefully coiled under the device and the leads and device were placed in the pocket. The pocket was vigorously irrigated with an antibiotic solution. A Tyrx pouch was used.    After placement of the device in the pocket and hemostasis was achieved, the pocket was closed in layers with 2-0, 3-0 deep layers and 4-0  for subcuticular layer. Sterile gauze and an occlusive dressing were placed over Steri-Strips. The pocket was inspected with fluoroscopy to ensure there were no retained radioopaque marked sponges.        The estimated blood loss was 30 ml. The patient tolerated the procedure well.    Complications:  None    Final Implant Settings:  Device: Company - [employer]    Lead Parameters  Atrial: 551ohms, P wave 2 mV, threshold 0.5 V@0.4msec,  RVent: 775 ohms, R wave 7.5 mV, threshold 1 V@0.4msec,    Summary:  Successful implantation of a left sub-pectoral Medtronic dual-chamber pacemaker via axillary vein stick and RV lead placed in left bundle area.    Discharge:   The patient left the EP laboratory in stable condition.     Follow up:   The patient should be alert for bleeding, swelling, or signs of infection. The patient should call the electrophysiologist immediately if symptoms recur, or for any problems. The patient and family (with HIPPA consent)  have been instructed accordingly.   Follow up in Clinic for wound check. Device clinic appointment will be scheduled then for routine device analysis and  reprogramming if necessary. Remote monitoring will be instituted if possible.     ATTESTATION   As the responsible attending physician, I was present and directly participating in the entire procedure without a fellow.     See complete procedural log and parameters.     Complications:   None    Stents/Implants:   Implants          Pacemaker          Lead, Capsurefix Novus, 52 Cm - [id number] - Implanted        Inventory item: LEAD, CAPSUREFIX NOVUS, 52 CM Model/Cat number: 5076-52    Serial number: [id number] : [employer]    Lot number: [id number] Device identifier: [id number]    Implant Date: 7/1/2025        GUDID Information       Request status Successful        Brand name: Capsurefix® Novus Version/Model: 5076-52    Company name: [employer]. MRI safety info as of 7/1/25: Labeling does not contain MRI Safety Information    Contains dry or latex rubber: No      GMDN P.T. name: Endocardial/interventricular septal pacing lead                As of 7/1/2025       Status: Implanted                        Lead, Select Secure, Mdl 3830 69 Cm - [id number] - Implanted        Inventory item: LEAD, SELECT SECURE, MDL 3830 69 CM Model/Cat number: 3830-69    Serial number: [id number] : [employer]    Lot number: [id number] Device identifier: [id number]    Implant Date: 7/1/2025        GUDID Information       Request status Successful        Brand name: SELECTSECURE™ Version/Model: [id number]    Company name: [employer] MRI safety info as of 7/1/25: Labeling does not contain MRI Safety Information    Contains dry or latex rubber: No      GMDN P.T. name: Endocardial/interventricular septal pacing lead                As of 7/1/2025       Status: Implanted                        Pacemaker, Dual Chamber, [place] [address]ri Xt Dr - [id number] - Implanted        Inventory item: PACEMAKER, DUAL CHAMBER, AYAH MRI XT DR Model/Cat number: W1DR01    Serial number: [id number] : [employer]     Lot number: [id number] Device identifier: [id number]    Implant Date: 7/1/2025        GUDID Information       Request status Successful        Brand name: [employer]™ XT DR MRI SureScan™ Version/Model: W1DR01    Company name: [employer] MRI safety info as of 7/1/25: MR Conditional    Contains dry or latex rubber: No      GMDN P.T. name: Dual-chamber implantable pacemaker, rate-responsive                As of 7/1/2025       Status: Implanted                              Anticoagulation/Antiplatelet Plan:   Continue anticoagulation    Estimated Blood Loss:   20 mL    Anesthesia: Moderate Sedation Anesthesia Staff: No anesthesia staff entered.    Any Specimen(s) Removed:   Order Name Source Comment Collection Info Order Time   CBC Blood, Venous  Collected By: Valentin Birch RN 7/1/2025  7:10 AM     Release result to [facility]   Immediate        BASIC METABOLIC PANEL Blood, Venous  Collected By: Valentin Birch RN 7/1/2025  7:10 AM     Release result to [facility]   Immediate            Disposition:   Home      Electronically signed by: Moody Prado MD, 7/1/2025 2:41 PM

## 2025-07-02 ENCOUNTER — ANCILLARY PROCEDURE (OUTPATIENT)
Facility: CLINIC | Age: 85
End: 2025-07-02
Payer: COMMERCIAL

## 2025-07-02 DIAGNOSIS — I44.2 COMPLETE HEART BLOCK: ICD-10-CM

## 2025-07-02 DIAGNOSIS — I44.1 ATRIOVENTRICULAR BLOCK, SECOND DEGREE: ICD-10-CM

## 2025-07-02 DIAGNOSIS — I44.2 INTERMITTENT COMPLETE HEART BLOCK: ICD-10-CM

## 2025-07-24 ENCOUNTER — TELEPHONE (OUTPATIENT)
Dept: CARDIOLOGY | Facility: HOSPITAL | Age: 85
End: 2025-07-24
Payer: COMMERCIAL

## 2025-07-25 ENCOUNTER — APPOINTMENT (OUTPATIENT)
Dept: CARDIOLOGY | Facility: HOSPITAL | Age: 85
End: 2025-07-25
Payer: COMMERCIAL

## 2025-07-25 ENCOUNTER — HOSPITAL ENCOUNTER (EMERGENCY)
Facility: HOSPITAL | Age: 85
Discharge: HOME | End: 2025-07-25
Attending: STUDENT IN AN ORGANIZED HEALTH CARE EDUCATION/TRAINING PROGRAM
Payer: COMMERCIAL

## 2025-07-25 ENCOUNTER — APPOINTMENT (OUTPATIENT)
Dept: RADIOLOGY | Facility: HOSPITAL | Age: 85
End: 2025-07-25
Payer: COMMERCIAL

## 2025-07-25 VITALS
HEART RATE: 95 BPM | RESPIRATION RATE: 16 BRPM | DIASTOLIC BLOOD PRESSURE: 96 MMHG | BODY MASS INDEX: 16.73 KG/M2 | SYSTOLIC BLOOD PRESSURE: 168 MMHG | WEIGHT: 98 LBS | HEIGHT: 64 IN | OXYGEN SATURATION: 100 % | TEMPERATURE: 99.9 F

## 2025-07-25 DIAGNOSIS — M21.332 WRIST DROP, LEFT WRIST: Primary | ICD-10-CM

## 2025-07-25 PROCEDURE — 2500000004 HC RX 250 GENERAL PHARMACY W/ HCPCS (ALT 636 FOR OP/ED): Performed by: STUDENT IN AN ORGANIZED HEALTH CARE EDUCATION/TRAINING PROGRAM

## 2025-07-25 PROCEDURE — 71046 X-RAY EXAM CHEST 2 VIEWS: CPT

## 2025-07-25 PROCEDURE — 73080 X-RAY EXAM OF ELBOW: CPT | Mod: LT

## 2025-07-25 PROCEDURE — 93005 ELECTROCARDIOGRAM TRACING: CPT

## 2025-07-25 PROCEDURE — 73080 X-RAY EXAM OF ELBOW: CPT | Mod: LEFT SIDE | Performed by: STUDENT IN AN ORGANIZED HEALTH CARE EDUCATION/TRAINING PROGRAM

## 2025-07-25 PROCEDURE — 99284 EMERGENCY DEPT VISIT MOD MDM: CPT | Mod: 25 | Performed by: STUDENT IN AN ORGANIZED HEALTH CARE EDUCATION/TRAINING PROGRAM

## 2025-07-25 PROCEDURE — 71046 X-RAY EXAM CHEST 2 VIEWS: CPT | Performed by: STUDENT IN AN ORGANIZED HEALTH CARE EDUCATION/TRAINING PROGRAM

## 2025-07-25 RX ORDER — PREDNISONE 50 MG/1
50 TABLET ORAL ONCE
Status: COMPLETED | OUTPATIENT
Start: 2025-07-25 | End: 2025-07-25

## 2025-07-25 RX ORDER — PREDNISONE 50 MG/1
50 TABLET ORAL DAILY
Qty: 5 TABLET | Refills: 0 | Status: SHIPPED | OUTPATIENT
Start: 2025-07-25 | End: 2025-07-30

## 2025-07-25 RX ADMIN — PREDNISONE 50 MG: 50 TABLET ORAL at 22:57

## 2025-07-25 ASSESSMENT — PAIN SCALES - GENERAL: PAINLEVEL_OUTOF10: 0 - NO PAIN

## 2025-07-25 ASSESSMENT — PAIN - FUNCTIONAL ASSESSMENT: PAIN_FUNCTIONAL_ASSESSMENT: 0-10

## 2025-07-26 NOTE — DISCHARGE INSTRUCTIONS
Take prednisone once a day for the next 5 days which will help to decrease the inflammation and irritation of the affected nerve.  This will help to recover from your symptoms of radial nerve palsy more quickly.  You can follow-up with orthopedic surgery Dr. Self by calling the office to schedule an appointment for further evaluation of your symptoms outside of the hospital.  They can help to make sure that you are recovering appropriately and provide further recommendations and testing as needed.    Return to the ED for reevaluation in case of any new or worsening symptoms like worsening or progressive one-sided weakness, paralysis, one-sided numbness, sudden vision loss, facial droop, slurred speech, inability to speak all of which could be signs of stroke and require immediate reassessment by physician.

## 2025-07-26 NOTE — ED PROVIDER NOTES
HPI   Chief Complaint   Patient presents with    Wrist Injury     Pt presents to the Ed with c/c of limp left wrist. Pt states it started out of no where and its not painful.        This is an 84-year-old female with a past medical history of B-cell lymphoma, osteoporosis, trigger finger, protein calorie malnutrition, complete heart block s/p pacemaker placement presenting to the ED for evaluation of left wrist drop and paresthesias to the dorsal left forearm.  She states that she was washing dishes today and was not having any trouble with this.  After preparing dinner, she noticed that she could not lift the left wrist.  This happened around 5 or 6 PM tonight.  She presented to the ED at 9:30 PM.  She denies any fall or injury, any trauma preceding this.  She is not sure if she was leaning her arm or her elbow against something tonight while eating dinner or preparing it that may have led to this.  She denies any weakness at the elbow, the shoulder, she denies any similar episodes in the past.  She does admit to some paresthesia to the dorsal forearm on the left as well.      History provided by:  Patient and caregiver   used: No            Patient History   Medical History[1]  Surgical History[2]  Family History[3]  Social History[4]    Physical Exam   ED Triage Vitals [07/25/25 2138]   Temperature Heart Rate Respirations BP   37.7 °C (99.9 °F) 95 16 (!) 168/96      Pulse Ox Temp Source Heart Rate Source Patient Position   100 % Temporal Monitor Sitting      BP Location FiO2 (%)     Left arm --       Physical Exam  General: well developed, thin notably female who is awake and alert, oriented to self and place, in no apparent distress.  Caregiver at bedside.  Eyes: sclera clear bilaterally, PERRL, EOMI  HENT: normocephalic, atraumatic.   CV: regular rate and rhythm, no murmur, no gallops, or rubs. radial pulses +2/4 bilaterally  Resp: clear to ascultation bilaterally, no wheezes, rales, or  rhonchi  MSK: No midline spinal tenderness, strength +5/5 to abduction and adductio against resistance at the shoulders bilaterally, as well as flexion and extension of the elbows bilaterally.  Normal  strength and extension of the fingers in the wrist on the right, she is unable to extend the wrist or the fingers on the left.  No tenderness or swelling, no erythema bruising or obvious deformity of the elbow or the forearm or the wrist or the fingers or the hand.  No swelling of the extremities.  Neuro: Patient reports mild paresthesias to the dorsal left forearm extending proximally to the level of the elbow.  Sensation otherwise fully intact.  No other focal neurologic deficits.  Psych: appropriate mood and affect, cooperative with exam  Skin: warm, dry, without evidence of rash or abrasions    ED Course & MDM   Diagnoses as of 07/25/25 2311   Wrist drop, left wrist                 No data recorded     Erica Coma Scale Score: 15 (07/25/25 7325 : Rubina Ace RN)                           Medical Decision Making  The patient is awake and alert, in no apparent distress here.  She is oriented to self and place.  She is hard of hearing.  She has a caregiver at bedside.  She denies any fall or injury preceding the symptoms that started 4-5 hours prior to my evaluation.  On exam she does have good  strength bilaterally, she is not able to extend the left fingers or the left wrist, she complains of mild paresthesia to the dorsal left forearm that extends to the level of the elbow.  She has no paresthesias more proximally affecting the upper arm, the shoulder, or the neck.  She denies any neck pain, any fall or injury recently.  She has normal strength in flexion and extension of the elbow against resistance as well as abduction and adduction of the shoulder against resistance she has no other deficits on exam, no paresthesias, no weakness of the lower extremities.  Symptoms are not suggestive of acute  stroke, the focal nature of the symptoms extending from the elbow distally and only affecting the radial nerve distribution are diagnostic of a radial nerve palsy.  The fact that the symptoms began at the elbow and extend distally suggest impingement at the elbow.  She denies any fall or injury to the area.  I ordered an x-ray of the elbow, the ED nurse placed her in a prefabricated left wrist splint, she was started on prednisone.    The patient was concerned that the pacemaker may have something to do with this, she feels like the pacemaker itself has shifted towards the armpit compared with the anterior chest wall when it was initially placed and she is very concerned about this.  I ordered a chest x-ray to evaluate as well as an EKG to ensure that it is still functioning properly.  I do not feel that this is likely causing her nerve palsy as she has no symptoms proximal to the elbow, I would expect symptoms all the way to the level of the shoulder if there was an issue with the pacemaker or the wires causing this.    I reviewed the x-ray imaging which shows That the pacemaker appears to be intact without any signs of fracture or dislodgment of the wires.  Her EKG also shows that the pacemaker is functioning.  There is no evidence of any fracture or bony lesion at the elbow causing nerve compression. She was referred to orthopedic surgery for follow-up for further evaluation of her radial nerve palsy.    EKG on my interpretation at 2310 shows atrial sensed ventricular paced rhythm, rate of 76 bpm.  Normal axis.  QTc 468 ms, GA interval 198.  No ST elevation or depression, no acute ischemic pattern.  No STEMI.  Pacemaker appears to be functioning normally.    XR chest 2 views   Final Result   1.  No evidence of acute cardiopulmonary process.                  MACRO:   None        Signed by: Papa Montana 7/25/2025 11:53 PM   Dictation workstation:   [id number]      XR elbow left 3+ views   Final Result   No  radiographic evidence of fracture or dislocation.             Signed by: Papa Montana 7/25/2025 11:52 PM   Dictation workstation:   [id number]          Procedure  Procedures         [1]   Past Medical History:  Diagnosis Date    Diffuse large B cell lymphoma     Hypertension    [2]   Past Surgical History:  Procedure Laterality Date    CARDIAC ELECTROPHYSIOLOGY PROCEDURE N/A 7/1/2025    Procedure: PPM IMPLANT DUAL;  Surgeon: Moody Carrillo MD;  Location: [facility] Cardiac Cath Lab;  Service: Electrophysiology;  Laterality: N/A;  [place] [address] i44.2/MDT    CT GUIDED IMAGING FOR NEEDLE PLACEMENT  7/17/2017    CT GUIDED IMAGING FOR NEEDLE PLACEMENT [facility]   [3] No family history on file.  [4]   Social History  Tobacco Use    Smoking status: Never    Smokeless tobacco: Never   Substance Use Topics    Alcohol use: Never    Drug use: Never        Graham Ibrahim DO  07/26/25 0137

## 2025-07-31 LAB
ATRIAL RATE: 76 BPM
P AXIS: 79 DEGREES
P OFFSET: 178 MS
P ONSET: 122 MS
PR INTERVAL: 198 MS
Q ONSET: 221 MS
QRS COUNT: 12 BEATS
QRS DURATION: 94 MS
QT INTERVAL: 416 MS
QTC CALCULATION(BAZETT): 468 MS
QTC FREDERICIA: 449 MS
R AXIS: 97 DEGREES
T AXIS: 72 DEGREES
T OFFSET: 429 MS
VENTRICULAR RATE: 76 BPM

## 2025-08-11 DIAGNOSIS — Z95.0 CARDIAC PACEMAKER IN SITU: ICD-10-CM

## 2025-08-11 DIAGNOSIS — I44.2 COMPLETE HEART BLOCK: Primary | ICD-10-CM

## 2025-08-12 ENCOUNTER — ANCILLARY PROCEDURE (OUTPATIENT)
Facility: CLINIC | Age: 85
End: 2025-08-12
Payer: COMMERCIAL

## 2025-08-12 ENCOUNTER — HOSPITAL ENCOUNTER (OUTPATIENT)
Dept: RADIOLOGY | Facility: HOSPITAL | Age: 85
Discharge: HOME | End: 2025-08-12
Payer: COMMERCIAL

## 2025-08-12 DIAGNOSIS — I44.2 INTERMITTENT COMPLETE HEART BLOCK: ICD-10-CM

## 2025-08-12 DIAGNOSIS — Z95.0 CARDIAC PACEMAKER IN SITU: ICD-10-CM

## 2025-08-12 DIAGNOSIS — I44.1 ATRIOVENTRICULAR BLOCK, SECOND DEGREE: ICD-10-CM

## 2025-08-12 DIAGNOSIS — I44.2 COMPLETE HEART BLOCK: ICD-10-CM

## 2025-08-12 DIAGNOSIS — Z95.0 CARDIAC PACEMAKER IN SITU: Primary | ICD-10-CM

## 2025-08-12 PROCEDURE — 71046 X-RAY EXAM CHEST 2 VIEWS: CPT

## 2025-08-12 PROCEDURE — 71046 X-RAY EXAM CHEST 2 VIEWS: CPT | Performed by: RADIOLOGY

## 2025-08-12 PROCEDURE — 93280 PM DEVICE PROGR EVAL DUAL: CPT | Performed by: INTERNAL MEDICINE

## 2025-08-12 PROCEDURE — 93280 PM DEVICE PROGR EVAL DUAL: CPT

## 2025-08-27 ENCOUNTER — OFFICE VISIT (OUTPATIENT)
Facility: CLINIC | Age: 85
End: 2025-08-27
Payer: COMMERCIAL

## 2025-08-27 ENCOUNTER — HOSPITAL ENCOUNTER (OUTPATIENT)
Dept: RADIOLOGY | Facility: HOSPITAL | Age: 85
Discharge: HOME | End: 2025-08-27
Payer: COMMERCIAL

## 2025-08-27 VITALS
WEIGHT: 98 LBS | BODY MASS INDEX: 17.36 KG/M2 | SYSTOLIC BLOOD PRESSURE: 160 MMHG | DIASTOLIC BLOOD PRESSURE: 86 MMHG | HEIGHT: 63 IN

## 2025-08-27 DIAGNOSIS — Z95.0 CARDIAC PACEMAKER IN SITU: Primary | ICD-10-CM

## 2025-08-27 DIAGNOSIS — Z95.0 CARDIAC PACEMAKER IN SITU: ICD-10-CM

## 2025-08-27 DIAGNOSIS — I44.2 COMPLETE HEART BLOCK: ICD-10-CM

## 2025-08-27 PROCEDURE — 71046 X-RAY EXAM CHEST 2 VIEWS: CPT

## 2025-08-27 PROCEDURE — 1126F AMNT PAIN NOTED NONE PRSNT: CPT | Performed by: STUDENT IN AN ORGANIZED HEALTH CARE EDUCATION/TRAINING PROGRAM

## 2025-08-27 PROCEDURE — 3079F DIAST BP 80-89 MM HG: CPT | Performed by: STUDENT IN AN ORGANIZED HEALTH CARE EDUCATION/TRAINING PROGRAM

## 2025-08-27 PROCEDURE — 99212 OFFICE O/P EST SF 10 MIN: CPT

## 2025-08-27 PROCEDURE — 99024 POSTOP FOLLOW-UP VISIT: CPT | Performed by: STUDENT IN AN ORGANIZED HEALTH CARE EDUCATION/TRAINING PROGRAM

## 2025-08-27 PROCEDURE — 1036F TOBACCO NON-USER: CPT | Performed by: STUDENT IN AN ORGANIZED HEALTH CARE EDUCATION/TRAINING PROGRAM

## 2025-08-27 PROCEDURE — 1159F MED LIST DOCD IN RCRD: CPT | Performed by: STUDENT IN AN ORGANIZED HEALTH CARE EDUCATION/TRAINING PROGRAM

## 2025-08-27 PROCEDURE — 3077F SYST BP >= 140 MM HG: CPT | Performed by: STUDENT IN AN ORGANIZED HEALTH CARE EDUCATION/TRAINING PROGRAM

## 2025-08-27 ASSESSMENT — ENCOUNTER SYMPTOMS
LOSS OF SENSATION IN FEET: 0
OCCASIONAL FEELINGS OF UNSTEADINESS: 0
DEPRESSION: 0

## 2025-08-27 ASSESSMENT — PATIENT HEALTH QUESTIONNAIRE - PHQ9
SUM OF ALL RESPONSES TO PHQ9 QUESTIONS 1 AND 2: 0
2. FEELING DOWN, DEPRESSED OR HOPELESS: NOT AT ALL
1. LITTLE INTEREST OR PLEASURE IN DOING THINGS: NOT AT ALL

## 2025-08-27 ASSESSMENT — COLUMBIA-SUICIDE SEVERITY RATING SCALE - C-SSRS
1. IN THE PAST MONTH, HAVE YOU WISHED YOU WERE DEAD OR WISHED YOU COULD GO TO SLEEP AND NOT WAKE UP?: NO
2. HAVE YOU ACTUALLY HAD ANY THOUGHTS OF KILLING YOURSELF?: NO
6. HAVE YOU EVER DONE ANYTHING, STARTED TO DO ANYTHING, OR PREPARED TO DO ANYTHING TO END YOUR LIFE?: NO

## 2025-08-27 ASSESSMENT — PAIN SCALES - GENERAL: PAINLEVEL_OUTOF10: 0-NO PAIN

## 2025-11-25 ENCOUNTER — APPOINTMENT (OUTPATIENT)
Facility: CLINIC | Age: 85
End: 2025-11-25
Payer: COMMERCIAL

## (undated) DEVICE — SUTURE, STRATAFIX, 3-0, SPIRAL MONOCRYL PLUS, UNDYED 20CM  SH

## (undated) DEVICE — ENVELOPE, ANTIBACTERIAL, AIGIS RX TYRX, ABSORBABLE, MED

## (undated) DEVICE — CABLE, SURGICAL, SM CLIP

## (undated) DEVICE — Device

## (undated) DEVICE — SUTURE, STRATAFIX, 4-0, MONOCRYL PLUS, PS-2 30CM, UNDYED

## (undated) DEVICE — SLITTER, ADJUSTABLE

## (undated) DEVICE — PAD, ELECTRODE DEFIB PADPRO ADULT STRL W/ADAPTER

## (undated) DEVICE — MICROINTRODUCER KIT, VSI, 4FR X 40CM, STIFFEN

## (undated) DEVICE — GUIDEWIRE, J TIP, 3 MM, 0.035 IN X 150 CM, PTFE

## (undated) DEVICE — INTRODUCER SYSTEM, PRELUDE SNAP, SPLITTABLE, HEMOSTATIC, 7FR

## (undated) DEVICE — CATHETER, ACUMEN C315, FIXED, HIS SHAPE